# Patient Record
Sex: FEMALE | Race: OTHER | HISPANIC OR LATINO | Employment: FULL TIME | URBAN - METROPOLITAN AREA
[De-identification: names, ages, dates, MRNs, and addresses within clinical notes are randomized per-mention and may not be internally consistent; named-entity substitution may affect disease eponyms.]

---

## 2020-03-17 ENCOUNTER — OFFICE VISIT (OUTPATIENT)
Dept: OBGYN CLINIC | Facility: MEDICAL CENTER | Age: 35
End: 2020-03-17
Payer: OTHER MISCELLANEOUS

## 2020-03-17 VITALS
DIASTOLIC BLOOD PRESSURE: 80 MMHG | BODY MASS INDEX: 27.31 KG/M2 | SYSTOLIC BLOOD PRESSURE: 125 MMHG | WEIGHT: 160 LBS | HEIGHT: 64 IN

## 2020-03-17 DIAGNOSIS — M25.562 ACUTE PAIN OF LEFT KNEE: Primary | ICD-10-CM

## 2020-03-17 PROCEDURE — 99203 OFFICE O/P NEW LOW 30 MIN: CPT | Performed by: ORTHOPAEDIC SURGERY

## 2020-03-17 RX ORDER — LISINOPRIL 20 MG/1
20 TABLET ORAL DAILY
COMMUNITY

## 2020-03-17 NOTE — LETTER
March 17, 2020     Patient: Kristie Lewis   YOB: 1985   Date of Visit: 3/17/2020       To Whom it May Concern:    Kristie Lewis is under my professional care  She was seen in my office on 3/17/2020  She may return to work with limitations no running, no walking greater than 10 minutes without rest   These restriction will be in place through 4/1/2020  If you have any questions or concerns, please don't hesitate to call           Sincerely,          Terell Dick MD        CC: No Recipients

## 2020-03-17 NOTE — PROGRESS NOTES
Orthopaedic Surgery Note    CC: Left Knee Pain      HPI:  Ms Bianca Ray is a 30 y  o female with a history of left knee pain following an incident at work  Patient reports she works as a  and was attempting to arrest someone on 3/12/2020 and during the arrest she fell onto a concrete surface and impacted the anterior aspect of the left knee  She did have immediate pain and developed swelling  [facility] obtained, which she has brought on a disk today  She did not have a radiology report  She was also provided a knee brace  ALLERGIES:  No Known Allergies    CURRENT MEDICATIONS:  Current Outpatient Medications   Medication Sig Dispense Refill    lisinopril (ZESTRIL) 20 mg tablet Take 20 mg by mouth daily       No current facility-administered medications for this visit  PAST MEDICAL HISTORY  Past Medical History:   Diagnosis Date    Hypertension        SURGICAL HISTORY  History reviewed  No pertinent surgical history  FAMILY HISTORY  History reviewed  No pertinent family history      SOCIAL HISTORY  Social History     Socioeconomic History    Marital status: /Civil Union     Spouse name: Not on file    Number of children: Not on file    Years of education: Not on file    Highest education level: Not on file   Occupational History    Not on file   Social Needs    Financial resource strain: Not on file    Food insecurity:     Worry: Not on file     Inability: Not on file    Transportation needs:     Medical: Not on file     Non-medical: Not on file   Tobacco Use    Smoking status: Never Smoker    Smokeless tobacco: Never Used   Substance and Sexual Activity    Alcohol use: Not on file    Drug use: Not on file    Sexual activity: Not on file   Lifestyle    Physical activity:     Days per week: Not on file     Minutes per session: Not on file    Stress: Not on file   Relationships    Social connections:     Talks on phone: Not on file     Gets together: Not on file     Attends Sabianism service: Not on file     Active member of club or organization: Not on file     Attends meetings of clubs or organizations: Not on file     Relationship status: Not on file    Intimate partner violence:     Fear of current or ex partner: Not on file     Emotionally abused: Not on file     Physically abused: Not on file     Forced sexual activity: Not on file   Other Topics Concern    Not on file   Social History Narrative    Not on file         Review of Systems   negative except per above and HPI  Physical Exam    Vitals  Vitals:    03/17/20 0816   BP: 125/80       BMI  Body mass index is 27 46 kg/m²  GENERAL: No acute distress  Alert and oriented  Well nourished and well hydrated  Appears stated age  HEENT : Normocephalic, atraumatic  Extraocular movements intact  Mucous membranes moist  NECK: Supple, trachea midline  LUNGS: Adequate and symmetric respiratory effort  No intercostal retractions or accessory muscle use  HEART: Extremities warm and perfused  ABDOMEN: Nondistended  SKIN: Warm and dry, no rash  Left  Knee   Inspection/Appearance:       Swelling: Yes      Patella is midline  Superficial abrasion overlying the anterior aspect of the knee  Alignment:  Knee is in neutral  Palpation - Soft Tissue: normal without effusion  ROM:       Extension - 0          Flexion - 100 (limited by pain)      extensor lag: no  Stability:  demonstrates no varus, valgus, anterior drawer or posterior drawer  Patella: stable, tracks normally  Motor:        5/5 quadriceps       5/5 hamstrings  Vascular: Knee is warm and sensate with normal refill  Negative Faheem        Imaging  A) Imaging modality available  Radiographs: yes  MRI scan: no  CT scan: no    B) Imaging findings  Subchondral cysts: no  Subchondral sclerosis: no  Periarticular osteophytes: no  Joint subluxation: no  Joint space narrowing: no  Bone-on-bone articulations: no  Avascular necrosis: no    No fracture or dislocation on my review  Assessment and Plan  Left Knee Contusion    - discussed with patient that radiographs do not reveal any fracture or dislocation on my review  At this point in time her exam does not demonstrate any ligamentous instability  - recommend PT, WBAT, ROM as tolerated  - NSAIDs, tylenol, icing, elevation  - I did  her that I think this will likely improve over the next few weeks with above and PT  - PT order placed  - wean out of brace  - work noted provided  - followup 2 weeks for repeat exam, if not significant improvement would consider MRI              Alexa Bingham MD  Adult Reconstruction Surgery  Department of [place] [id number]  8:44 AM

## 2020-03-19 ENCOUNTER — EVALUATION (OUTPATIENT)
Dept: PHYSICAL THERAPY | Facility: MEDICAL CENTER | Age: 35
End: 2020-03-19
Payer: OTHER MISCELLANEOUS

## 2020-03-19 DIAGNOSIS — M25.562 ACUTE PAIN OF LEFT KNEE: Primary | ICD-10-CM

## 2020-03-19 PROCEDURE — 97161 PT EVAL LOW COMPLEX 20 MIN: CPT | Performed by: PHYSICAL THERAPIST

## 2020-03-19 PROCEDURE — 97112 NEUROMUSCULAR REEDUCATION: CPT | Performed by: PHYSICAL THERAPIST

## 2020-03-19 NOTE — PROGRESS NOTES
PT Evaluation     Today's date: 3/19/2020  Patient name: Tanisha Johnson  : 1985  MRN: [id number]  Referring provider: Brent Parker MD  Dx:   Encounter Diagnosis   Name Primary?  Acute pain of left knee                   Assessment  Assessment details: Tanisha Johnson is a 31 y/o female who presents with complaints of left knee pain  The patients greatest concerns are not being able to walk, stand, or perform functional activities without pain  Patient presents c/ bruising over the left patella and inferior fat pad inflammation  No ligamentous laxity present  She demonstrates restricted ROM and painful ambulation  Primary movement impairment diagnosis of left patellofemoral hypomobility, resulting in pathoanatomical symptoms of acute pain of left knee, which limits her ability to perform functional activities without pain  Pt  will benefit from skilled PT services that includes manual therapy techniques to enhance tissue extensibility, neuromuscular re-education to facilitate motor control, therapeutic exercise to increase functional mobility, and modalities prn to reduce pain and inflammation    Impairments: abnormal coordination, abnormal gait, abnormal muscle firing, abnormal or restricted ROM, abnormal movement, activity intolerance, impaired balance, impaired physical strength, lacks appropriate home exercise program, pain with function and weight-bearing intolerance  Understanding of Dx/Px/POC: good   Prognosis: good    Goals  Impairment Goals  - Pt I with initial HEP in 1-2 visits  - Improve ROM equal to contralateral side in 4-6 weeks  - Increase strength to 5/5 in all affected areas in 4-6 weeks    Functional Goals  - Increase Functional Status Measure to: 62 in 6-8 weeks  - Patient will be independent with comprehensive HEP in 6-8 weeks  - Ambulation is improved to prior level of function in 6-8 weeks  - Stair climbing is improved to prior level of function in 6-8 weeks  - Squatting is improved to prior level of function in 6-8 weeks    Plan  Patient would benefit from: PT eval  Planned modality interventions: thermotherapy: hydrocollator packs and cryotherapy  Planned therapy interventions: manual therapy, joint mobilization, neuromuscular re-education, patient education, strengthening, stretching, therapeutic exercise, home exercise program, graded exercise, gait training, functional ROM exercises, flexibility, activity modification, balance, balance/weight bearing training and body mechanics training  Frequency: 2x week  Duration in weeks: 8  Treatment plan discussed with: patient      Subjective Evaluation    History of Present Illness  Mechanism of injury: Patient works as a  and on [employer], she was making an arrest when she fell on concrete and landed on her knee  She does not recall a twisting mechanism, or hearing a pop  She states that is extremely painful to flex and extend her knee  She c/o lateral and inferior knee pain  Patient would like to return to performing all functional activities without pain  Pain  Current pain ratin  At best pain ratin  At worst pain ratin  Quality: tight and sharp  Relieving factors: ice  Aggravating factors: standing and walking      Diagnostic Tests  X-ray: normal  Treatments  Current treatment: physical therapy  Patient Goals  Patient goals for therapy: increased strength, independence with ADLs/IADLs, return to sport/leisure activities, increased motion, improved balance, decreased pain and decreased edema        Objective     Observations   Left Knee   Positive for effusion and trophic changes  Negative for deformity  Palpation   Left   Tenderness of the lateral gastrocnemius and vastus lateralis  Tenderness   Left Knee   Tenderness in the inferior fat pad  No tenderness in the fibular head, inferior patella, lateral joint line, LCL (distal), LCL (proximal) and medial joint line       Neurological Testing     Sensation     Knee   Left Knee   Intact: light touch    Right Knee   Intact: light touch     Active Range of Motion   Left Knee   Flexion: 100 degrees   Extension: 5 degrees     Right Knee   Flexion: 140 degrees   Extension: -5 degrees   Extensor lag: 10 degrees     Passive Range of Motion   Left Knee   Flexion: 110 degrees   Extension: 0 degrees     Mobility   Patellar Mobility:   Left Knee   Hypomobile: left superior and left inferior  Tibiofemoral Mobility:   Left knee   Tibiofemoral tendons within functional limits include the anterior and posterior  Right knee Tibiofemoral tendons within functional limits include the anterior and posterior  Patellar Static Positioning   Left Knee: WFL  Right Knee: [facility]    Strength/Myotome Testing     Left Knee   Flexion: 3-  Extension: 3-  Quadriceps contraction: poor    Right Knee   Flexion: 5  Extension: 5  Quadriceps contraction: good    Additional Strength Details  Hip strength testing deferred d/t pain  Tests     Left Knee   Negative anterior drawer, anterior Lachman, lateral Faheem, medial Faheem, posterior drawer, posterior Lachman, valgus stress test at 0 degrees, valgus stress test at 30 degrees, varus stress test at 0 degrees and varus stress test at 30 degrees          Precautions:  None    Manual  3/19            PROM             Patellar mobs                                                        Exercise Diary              Heel slides 30x5s            Quad sets 30x5s            Gastroc str 3x30s            Hamstring str 3x30s            Quad str 3x30s                                                                                                                                                                                                                   Modalities              ICE             Compression X                                Marion Miller, PT  3/19/2020,9:02 AM

## 2020-03-25 ENCOUNTER — OFFICE VISIT (OUTPATIENT)
Dept: PHYSICAL THERAPY | Facility: MEDICAL CENTER | Age: 35
End: 2020-03-25
Payer: OTHER MISCELLANEOUS

## 2020-03-25 DIAGNOSIS — M25.562 ACUTE PAIN OF LEFT KNEE: Primary | ICD-10-CM

## 2020-03-25 PROCEDURE — 97110 THERAPEUTIC EXERCISES: CPT | Performed by: PHYSICAL THERAPIST

## 2020-03-25 PROCEDURE — 97112 NEUROMUSCULAR REEDUCATION: CPT | Performed by: PHYSICAL THERAPIST

## 2020-03-25 NOTE — PROGRESS NOTES
Daily Note     Today's date: 3/25/2020  Patient name: Alonzo Salcido  : 1985  MRN: [id number]  Referring provider: Elenita Mac MD  Dx:   Encounter Diagnosis     ICD-10-CM    1  Acute pain of left knee M25 562                   Subjective:  Patient states that she is feeling better overall  Objective: See treatment diary below    Assessment:  Patient presents c/ antalgic gait pattern  PROM 0-120  Patient education on HEP and proper gait mechanics  Tolerated treatment well  Patient would benefit from continued PT  Plan: Continue per plan of care        Precautions:  None    Manual  3/19 3/25           PROM  AZ           Patellar mobs  AZ                                                      Exercise Diary              Bike  5'           Heel slides 30x5s 30x5s           Quad sets 30x5s 30x5s           Gastroc str 3x30s 3x30s           Hamstring str 3x30s 3x30s           Quad str 3x30s            Knee flex AAROM  20x           SLR  3x10                                                                                                                                                                                        Modalities              ICE  10'           Compression X X

## 2020-03-27 ENCOUNTER — OFFICE VISIT (OUTPATIENT)
Dept: PHYSICAL THERAPY | Facility: MEDICAL CENTER | Age: 35
End: 2020-03-27
Payer: OTHER MISCELLANEOUS

## 2020-03-27 DIAGNOSIS — M25.562 ACUTE PAIN OF LEFT KNEE: Primary | ICD-10-CM

## 2020-03-27 PROCEDURE — 97110 THERAPEUTIC EXERCISES: CPT | Performed by: PHYSICAL THERAPIST

## 2020-03-27 PROCEDURE — 97112 NEUROMUSCULAR REEDUCATION: CPT | Performed by: PHYSICAL THERAPIST

## 2020-03-27 NOTE — PROGRESS NOTES
Daily Note     Today's date: 3/27/2020  Patient name: Mya Prado  : 1985  MRN: [id number]  Referring provider: David Ron MD  Dx:   Encounter Diagnosis     ICD-10-CM    1  Acute pain of left knee M25 562                   Subjective:  Patient reports that she feels good  Objective: See treatment diary below    Assessment:  Patient presents c/ positive gains in strength and ROM  Patient education on HEP and proper gait mechanics  Tolerated treatment well  Patient would benefit from continued PT  Plan: Continue per plan of care        Precautions:  None    Manual  3/19 3/25 3/27          PROM  AZ AZ          Patellar mobs  AZ AZ                                                     Exercise Diary              Bike  5' 10'          Heel slides 30x5s 30x5s 30x5s          Quad sets 30x5s 30x5s 30x5s          Gastroc str 3x30s 3x30s 3x30s          Hamstring str 3x30s 3x30s 3x30s          Quad str 3x30s  3x30s          Knee flex AAROM  20x 20x          SLR  3x10 3x10 1#                       Prone hamstring curls   2x10          Heel raises   3x10                       Step ups   10x L up          Step downs   10x L up R down                       SL balance   alph 1x                                                                                            Modalities              ICE  10' home          Compression X X           MH

## 2020-04-01 ENCOUNTER — OFFICE VISIT (OUTPATIENT)
Dept: PHYSICAL THERAPY | Facility: MEDICAL CENTER | Age: 35
End: 2020-04-01
Payer: OTHER MISCELLANEOUS

## 2020-04-01 DIAGNOSIS — M25.562 ACUTE PAIN OF LEFT KNEE: Primary | ICD-10-CM

## 2020-04-01 PROCEDURE — 97140 MANUAL THERAPY 1/> REGIONS: CPT | Performed by: PHYSICAL THERAPIST

## 2020-04-01 PROCEDURE — 97112 NEUROMUSCULAR REEDUCATION: CPT | Performed by: PHYSICAL THERAPIST

## 2020-04-01 PROCEDURE — 97110 THERAPEUTIC EXERCISES: CPT | Performed by: PHYSICAL THERAPIST

## 2020-04-03 ENCOUNTER — OFFICE VISIT (OUTPATIENT)
Dept: PHYSICAL THERAPY | Facility: MEDICAL CENTER | Age: 35
End: 2020-04-03
Payer: OTHER MISCELLANEOUS

## 2020-04-03 DIAGNOSIS — M25.562 ACUTE PAIN OF LEFT KNEE: Primary | ICD-10-CM

## 2020-04-03 PROCEDURE — 97110 THERAPEUTIC EXERCISES: CPT | Performed by: PHYSICAL THERAPIST

## 2020-04-03 PROCEDURE — 97140 MANUAL THERAPY 1/> REGIONS: CPT | Performed by: PHYSICAL THERAPIST

## 2020-04-03 PROCEDURE — 97112 NEUROMUSCULAR REEDUCATION: CPT | Performed by: PHYSICAL THERAPIST

## 2020-04-08 ENCOUNTER — OFFICE VISIT (OUTPATIENT)
Dept: PHYSICAL THERAPY | Facility: MEDICAL CENTER | Age: 35
End: 2020-04-08
Payer: OTHER MISCELLANEOUS

## 2020-04-08 DIAGNOSIS — M25.562 ACUTE PAIN OF LEFT KNEE: Primary | ICD-10-CM

## 2020-04-08 PROCEDURE — 97110 THERAPEUTIC EXERCISES: CPT | Performed by: PHYSICAL THERAPIST

## 2020-04-08 PROCEDURE — 97112 NEUROMUSCULAR REEDUCATION: CPT | Performed by: PHYSICAL THERAPIST

## 2020-04-10 ENCOUNTER — OFFICE VISIT (OUTPATIENT)
Dept: PHYSICAL THERAPY | Facility: MEDICAL CENTER | Age: 35
End: 2020-04-10
Payer: OTHER MISCELLANEOUS

## 2020-04-10 DIAGNOSIS — M25.562 ACUTE PAIN OF LEFT KNEE: Primary | ICD-10-CM

## 2020-04-10 PROCEDURE — 97112 NEUROMUSCULAR REEDUCATION: CPT | Performed by: PHYSICAL THERAPIST

## 2020-04-10 PROCEDURE — 97110 THERAPEUTIC EXERCISES: CPT | Performed by: PHYSICAL THERAPIST

## 2020-04-13 ENCOUNTER — OFFICE VISIT (OUTPATIENT)
Dept: PHYSICAL THERAPY | Facility: MEDICAL CENTER | Age: 35
End: 2020-04-13
Payer: OTHER MISCELLANEOUS

## 2020-04-13 DIAGNOSIS — M25.562 ACUTE PAIN OF LEFT KNEE: Primary | ICD-10-CM

## 2020-04-13 PROCEDURE — 97110 THERAPEUTIC EXERCISES: CPT | Performed by: PHYSICAL THERAPIST

## 2020-04-13 PROCEDURE — 97112 NEUROMUSCULAR REEDUCATION: CPT | Performed by: PHYSICAL THERAPIST

## 2020-04-13 PROCEDURE — 97530 THERAPEUTIC ACTIVITIES: CPT | Performed by: PHYSICAL THERAPIST

## 2020-04-15 ENCOUNTER — APPOINTMENT (OUTPATIENT)
Dept: PHYSICAL THERAPY | Facility: MEDICAL CENTER | Age: 35
End: 2020-04-15
Payer: OTHER MISCELLANEOUS

## 2020-04-17 ENCOUNTER — APPOINTMENT (OUTPATIENT)
Dept: PHYSICAL THERAPY | Facility: MEDICAL CENTER | Age: 35
End: 2020-04-17
Payer: OTHER MISCELLANEOUS

## 2020-04-17 DIAGNOSIS — M25.562 ACUTE PAIN OF LEFT KNEE: Primary | ICD-10-CM

## 2020-04-17 PROCEDURE — 97110 THERAPEUTIC EXERCISES: CPT | Performed by: PHYSICAL THERAPIST

## 2020-04-17 PROCEDURE — 97530 THERAPEUTIC ACTIVITIES: CPT | Performed by: PHYSICAL THERAPIST

## 2020-04-17 PROCEDURE — 97112 NEUROMUSCULAR REEDUCATION: CPT | Performed by: PHYSICAL THERAPIST

## 2020-04-20 ENCOUNTER — OFFICE VISIT (OUTPATIENT)
Dept: PHYSICAL THERAPY | Facility: MEDICAL CENTER | Age: 35
End: 2020-04-20
Payer: OTHER MISCELLANEOUS

## 2020-04-20 DIAGNOSIS — M25.562 ACUTE PAIN OF LEFT KNEE: Primary | ICD-10-CM

## 2020-04-20 PROCEDURE — 97530 THERAPEUTIC ACTIVITIES: CPT | Performed by: PHYSICAL THERAPIST

## 2020-04-20 PROCEDURE — 97110 THERAPEUTIC EXERCISES: CPT | Performed by: PHYSICAL THERAPIST

## 2020-04-20 PROCEDURE — 97112 NEUROMUSCULAR REEDUCATION: CPT | Performed by: PHYSICAL THERAPIST

## 2020-04-24 ENCOUNTER — OFFICE VISIT (OUTPATIENT)
Dept: PHYSICAL THERAPY | Facility: MEDICAL CENTER | Age: 35
End: 2020-04-24
Payer: OTHER MISCELLANEOUS

## 2020-04-24 DIAGNOSIS — M25.562 ACUTE PAIN OF LEFT KNEE: Primary | ICD-10-CM

## 2020-04-24 PROCEDURE — 97140 MANUAL THERAPY 1/> REGIONS: CPT | Performed by: PHYSICAL THERAPIST

## 2020-04-24 PROCEDURE — 97110 THERAPEUTIC EXERCISES: CPT | Performed by: PHYSICAL THERAPIST

## 2020-04-24 PROCEDURE — 97530 THERAPEUTIC ACTIVITIES: CPT | Performed by: PHYSICAL THERAPIST

## 2020-04-28 ENCOUNTER — OFFICE VISIT (OUTPATIENT)
Dept: PHYSICAL THERAPY | Facility: MEDICAL CENTER | Age: 35
End: 2020-04-28
Payer: OTHER MISCELLANEOUS

## 2020-04-28 DIAGNOSIS — M25.562 ACUTE PAIN OF LEFT KNEE: Primary | ICD-10-CM

## 2020-04-28 PROCEDURE — 97110 THERAPEUTIC EXERCISES: CPT | Performed by: PHYSICAL THERAPIST

## 2020-04-28 PROCEDURE — 97140 MANUAL THERAPY 1/> REGIONS: CPT | Performed by: PHYSICAL THERAPIST

## 2020-04-28 PROCEDURE — 97530 THERAPEUTIC ACTIVITIES: CPT | Performed by: PHYSICAL THERAPIST

## 2020-05-01 ENCOUNTER — OFFICE VISIT (OUTPATIENT)
Dept: PHYSICAL THERAPY | Facility: MEDICAL CENTER | Age: 35
End: 2020-05-01
Payer: OTHER MISCELLANEOUS

## 2020-05-01 DIAGNOSIS — M25.562 ACUTE PAIN OF LEFT KNEE: Primary | ICD-10-CM

## 2020-05-01 PROCEDURE — 97530 THERAPEUTIC ACTIVITIES: CPT | Performed by: PHYSICAL THERAPIST

## 2020-05-01 PROCEDURE — 97140 MANUAL THERAPY 1/> REGIONS: CPT | Performed by: PHYSICAL THERAPIST

## 2020-05-01 PROCEDURE — 97110 THERAPEUTIC EXERCISES: CPT | Performed by: PHYSICAL THERAPIST

## 2020-05-08 ENCOUNTER — OFFICE VISIT (OUTPATIENT)
Dept: PHYSICAL THERAPY | Facility: MEDICAL CENTER | Age: 35
End: 2020-05-08
Payer: OTHER MISCELLANEOUS

## 2020-05-08 DIAGNOSIS — M25.562 ACUTE PAIN OF LEFT KNEE: Primary | ICD-10-CM

## 2020-05-08 PROCEDURE — 97530 THERAPEUTIC ACTIVITIES: CPT | Performed by: PHYSICAL THERAPIST

## 2020-05-08 PROCEDURE — 97140 MANUAL THERAPY 1/> REGIONS: CPT | Performed by: PHYSICAL THERAPIST

## 2020-05-08 PROCEDURE — 97110 THERAPEUTIC EXERCISES: CPT | Performed by: PHYSICAL THERAPIST

## 2020-06-10 ENCOUNTER — OFFICE VISIT (OUTPATIENT)
Dept: OBGYN CLINIC | Facility: CLINIC | Age: 35
End: 2020-06-10
Payer: OTHER MISCELLANEOUS

## 2020-06-10 VITALS
SYSTOLIC BLOOD PRESSURE: 142 MMHG | WEIGHT: 120 LBS | BODY MASS INDEX: 22.66 KG/M2 | HEIGHT: 61 IN | DIASTOLIC BLOOD PRESSURE: 97 MMHG

## 2020-06-10 DIAGNOSIS — M25.562 CHRONIC PAIN OF LEFT KNEE: ICD-10-CM

## 2020-06-10 DIAGNOSIS — G89.29 CHRONIC PAIN OF LEFT KNEE: ICD-10-CM

## 2020-06-10 DIAGNOSIS — M25.362 KNEE INSTABILITY, LEFT: Primary | ICD-10-CM

## 2020-06-10 PROCEDURE — 99214 OFFICE O/P EST MOD 30 MIN: CPT | Performed by: ORTHOPAEDIC SURGERY

## 2020-06-11 ENCOUNTER — TELEPHONE (OUTPATIENT)
Dept: OBGYN CLINIC | Facility: MEDICAL CENTER | Age: 35
End: 2020-06-11

## 2020-06-18 ENCOUNTER — TELEPHONE (OUTPATIENT)
Dept: OBGYN CLINIC | Facility: MEDICAL CENTER | Age: 35
End: 2020-06-18

## 2020-06-18 DIAGNOSIS — G89.29 CHRONIC PAIN OF LEFT KNEE: Primary | ICD-10-CM

## 2020-06-18 DIAGNOSIS — M25.562 CHRONIC PAIN OF LEFT KNEE: Primary | ICD-10-CM

## 2020-06-22 ENCOUNTER — APPOINTMENT (OUTPATIENT)
Dept: LAB | Facility: MEDICAL CENTER | Age: 35
End: 2020-06-22
Payer: COMMERCIAL

## 2020-06-22 ENCOUNTER — OFFICE VISIT (OUTPATIENT)
Dept: OBGYN CLINIC | Facility: MEDICAL CENTER | Age: 35
End: 2020-06-22
Payer: OTHER MISCELLANEOUS

## 2020-06-22 VITALS
DIASTOLIC BLOOD PRESSURE: 90 MMHG | SYSTOLIC BLOOD PRESSURE: 144 MMHG | BODY MASS INDEX: 20.49 KG/M2 | WEIGHT: 120 LBS | HEART RATE: 81 BPM | TEMPERATURE: 98.7 F | HEIGHT: 64 IN

## 2020-06-22 DIAGNOSIS — S83.242A OTHER TEAR OF MEDIAL MENISCUS, CURRENT INJURY, LEFT KNEE, INITIAL ENCOUNTER: Primary | ICD-10-CM

## 2020-06-22 DIAGNOSIS — G89.29 CHRONIC PAIN OF LEFT KNEE: ICD-10-CM

## 2020-06-22 DIAGNOSIS — Z01.818 ENCOUNTER FOR PREADMISSION TESTING: ICD-10-CM

## 2020-06-22 DIAGNOSIS — M25.562 CHRONIC PAIN OF LEFT KNEE: ICD-10-CM

## 2020-06-22 LAB
ANION GAP SERPL CALCULATED.3IONS-SCNC: 5 MMOL/L (ref 4–13)
BASOPHILS # BLD AUTO: 0.06 THOUSANDS/ΜL (ref 0–0.1)
BASOPHILS NFR BLD AUTO: 1 % (ref 0–1)
BUN SERPL-MCNC: 14 MG/DL (ref 5–25)
CALCIUM SERPL-MCNC: 9 MG/DL (ref 8.3–10.1)
CHLORIDE SERPL-SCNC: 110 MMOL/L (ref 100–108)
CO2 SERPL-SCNC: 25 MMOL/L (ref 21–32)
CREAT SERPL-MCNC: 0.95 MG/DL (ref 0.6–1.3)
EOSINOPHIL # BLD AUTO: 0.16 THOUSAND/ΜL (ref 0–0.61)
EOSINOPHIL NFR BLD AUTO: 2 % (ref 0–6)
ERYTHROCYTE [DISTWIDTH] IN BLOOD BY AUTOMATED COUNT: 12.4 % (ref 11.6–15.1)
GFR SERPL CREATININE-BSD FRML MDRD: 79 ML/MIN/1.73SQ M
GLUCOSE P FAST SERPL-MCNC: 91 MG/DL (ref 65–99)
HCT VFR BLD AUTO: 41.3 % (ref 34.8–46.1)
HGB BLD-MCNC: 13.1 G/DL (ref 11.5–15.4)
IMM GRANULOCYTES # BLD AUTO: 0.01 THOUSAND/UL (ref 0–0.2)
IMM GRANULOCYTES NFR BLD AUTO: 0 % (ref 0–2)
LYMPHOCYTES # BLD AUTO: 1.45 THOUSANDS/ΜL (ref 0.6–4.47)
LYMPHOCYTES NFR BLD AUTO: 21 % (ref 14–44)
MCH RBC QN AUTO: 31.3 PG (ref 26.8–34.3)
MCHC RBC AUTO-ENTMCNC: 31.7 G/DL (ref 31.4–37.4)
MCV RBC AUTO: 99 FL (ref 82–98)
MONOCYTES # BLD AUTO: 0.72 THOUSAND/ΜL (ref 0.17–1.22)
MONOCYTES NFR BLD AUTO: 11 % (ref 4–12)
NEUTROPHILS # BLD AUTO: 4.36 THOUSANDS/ΜL (ref 1.85–7.62)
NEUTS SEG NFR BLD AUTO: 65 % (ref 43–75)
NRBC BLD AUTO-RTO: 0 /100 WBCS
PLATELET # BLD AUTO: 349 THOUSANDS/UL (ref 149–390)
PMV BLD AUTO: 9.8 FL (ref 8.9–12.7)
POTASSIUM SERPL-SCNC: 4.6 MMOL/L (ref 3.5–5.3)
RBC # BLD AUTO: 4.19 MILLION/UL (ref 3.81–5.12)
SODIUM SERPL-SCNC: 140 MMOL/L (ref 136–145)
WBC # BLD AUTO: 6.76 THOUSAND/UL (ref 4.31–10.16)

## 2020-06-22 PROCEDURE — 85025 COMPLETE CBC W/AUTO DIFF WBC: CPT

## 2020-06-22 PROCEDURE — 80048 BASIC METABOLIC PNL TOTAL CA: CPT

## 2020-06-22 PROCEDURE — 36415 COLL VENOUS BLD VENIPUNCTURE: CPT

## 2020-06-22 PROCEDURE — 99214 OFFICE O/P EST MOD 30 MIN: CPT | Performed by: ORTHOPAEDIC SURGERY

## 2020-06-22 RX ORDER — CEFAZOLIN SODIUM 2 G/50ML
2000 SOLUTION INTRAVENOUS ONCE
Status: CANCELLED | OUTPATIENT
Start: 2020-07-21 | End: 2020-06-22

## 2020-06-22 RX ORDER — ACETAMINOPHEN 325 MG/1
650 TABLET ORAL EVERY 6 HOURS PRN
Status: CANCELLED | OUTPATIENT
Start: 2020-06-22

## 2020-06-22 RX ORDER — TRAMADOL HYDROCHLORIDE 50 MG/1
50 TABLET ORAL EVERY 6 HOURS SCHEDULED
Status: CANCELLED | OUTPATIENT
Start: 2020-06-22

## 2020-06-22 NOTE — H&P (VIEW-ONLY)
Ortho Sports Medicine Knee New Patient Visit     Assesment:   37 y o  female left knee lateral meniscus tear    Plan:    Conservative treatment:    Ice to knee for 20 minutes at least 1-2 times daily  Crutches written  Post-op written     Imaging: All imaging from today was reviewed by myself and explained to the patient  Injection:    No Injection planned at this time  Surgery: All of the risks and benefits of operative treatment were explained to the patient, as well as the risks and benefits of any alternative treatment options, including nonoperative care  The risks of surgical treatement include, but are not limited to, infection, bleeding, blood clot, neurovascular damage, need for further surgery, continued pain, cardiovascular risk, and anesthesia risk  The patient understood this and elects to proceed forward with surgical intervention  We will proceed forward with surgical arthroscopy of the knee with lateral meniscectomy  Patient has tried and failed conservative treatment of greater than 5 weeks of physical therapy without relief and addition of her age I recommend that we proceed forward with Left knee lateral menisectomy  Follow up:    Return for 1 week post-op  Chief Complaint   Patient presents with    Left Knee - Pain       History of Present Illness: The patient is a 37 y o  female whose occupation is , referred to me by themself, seen in clinic for consultation of left knee pain  Pain is located lateral   The patient rates the pain as a 5/10  The pain has been present for 3 months  The patient sustained an injury in March 2020  Patient states that she has a  in 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 knee struck concrete the anterior aspect of her knee  She stated that she initially had a sharp stabbing pain in the lateral aspect of the knee  She had difficulty bearing weight immediately    Her knee did become very swollen and it took many days to reduce  She states that she had difficulty with range of motion  She was initially treated by [facility] in a prescribed her physical therapy that had improved her range of motion but she still has pain and lateral aspect of the knee with certain activities  She states the pain is present and described as sharp and stabbing with steep stairs, squatting, hyperflexion and any pivoting type motion  The pain is characterized as sharp, stabbing  The pain is present daily  Pain is improved by rest and ice  Pain is aggravated by stairs, squatting and pivoting on a planted foot  Symptoms include clicking, swelling and locking  The patient has tried rest, ice, NSAIDS and physical therapy  Knee Surgical History:  None    Past Medical, Social and Family History:  Past Medical History:   Diagnosis Date    Hypertension      History reviewed  No pertinent surgical history  No Known Allergies  Current Outpatient Medications on File Prior to Visit   Medication Sig Dispense Refill    lisinopril (ZESTRIL) 20 mg tablet Take 20 mg by mouth daily       No current facility-administered medications on file prior to visit        Social History     Socioeconomic History    Marital status: /Civil Union     Spouse name: Not on file    Number of children: Not on file    Years of education: Not on file    Highest education level: Not on file   Occupational History    Not on file   Social Needs    Financial resource strain: Not on file    Food insecurity:     Worry: Not on file     Inability: Not on file    Transportation needs:     Medical: Not on file     Non-medical: Not on file   Tobacco Use    Smoking status: Never Smoker    Smokeless tobacco: Never Used   Substance and Sexual Activity    Alcohol use: Not on file    Drug use: Not on file    Sexual activity: Not on file   Lifestyle    Physical activity:     Days per week: Not on file     Minutes per session: Not on file    Stress: Not on file   Relationships    Social connections:     Talks on phone: Not on file     Gets together: Not on file     Attends Jewish service: Not on file     Active member of club or organization: Not on file     Attends meetings of clubs or organizations: Not on file     Relationship status: Not on file    Intimate partner violence:     Fear of current or ex partner: Not on file     Emotionally abused: Not on file     Physically abused: Not on file     Forced sexual activity: Not on file   Other Topics Concern    Not on file   Social History Narrative    Not on file         I have reviewed the past medical, surgical, social and family history, medications and allergies as documented in the EMR  Review of systems: ROS is negative other than that noted in the HPI  Constitutional: Negative for fatigue and fever  HENT: Negative for sore throat  Respiratory: Negative for shortness of breath  Cardiovascular: Negative for chest pain  Gastrointestinal: Negative for abdominal pain  Endocrine: Negative for cold intolerance and heat intolerance  Genitourinary: Negative for flank pain  Musculoskeletal: Negative for back pain  Skin: Negative for rash  Allergic/Immunologic: Negative for immunocompromised state  Neurological: Negative for dizziness  Psychiatric/Behavioral: Negative for agitation  Physical Exam:    Blood pressure 144/90, pulse 81, temperature 98 7 °F (37 1 °C), height 5' 4" (1 626 m), weight 54 4 kg (120 lb)  General/Constitutional: NAD, well developed, well nourished  HENT: Normocephalic, atraumatic  CV: Intact distal pulses, regular rate  Resp: No respiratory distress or labored breathing  Lymphatic: No lymphadenopathy palpated  Neuro: Alert and Oriented x 3, no focal deficits  Psych: Normal mood, normal affect, normal judgement, normal behavior  Skin: Warm, dry, no rashes, no erythema      Knee Exam (focused):                 RIGHT LEFT   ROM:   0-130 0-130 pain laterally with hyperflexion    Palpation: Effusion negative small     MJL tenderness Negative Negative     LJL tenderness Negative Positive   Meniscus: Faheem Negative Positive    Apley's Compression Negative Positive   Instability: Varus stable stable     Valgus stable stable   Special Tests: Lachman Negative Negative     Posterior drawer Negative Negative     Anterior drawer Negative Negative     Pivot shift not tested not tested     Dial not tested not tested   Patella: Palpation no tenderness ttp inferior pole     Mobility 1/4 1/4     Apprehension Negative Negative   Other: Single leg 1/4 squat not tested not tested      LE NV Exam: +2 DP/PT pulses bilaterally  Sensation intact to light touch L2-S1 bilaterally     Bilateral hip ROM demonstrates no pain actively or passively    No calf tenderness to palpation bilaterally    Knee Imaging    X-rays of the left knee were reviewed, which demonstrate no acute pain or osseous abnormality  I have reviewed the radiology report and do not currently have a radiology reading from the outside facility, but have called and asked the facility to fax the official report to our office  MRI of the left knee were reviewed, which demonstrate lateral meniscus tear  I have reviewed the radiology report and do not currently have a radiology reading from the outside facility, but have called and asked the facility to fax the official report to our office

## 2020-07-15 ENCOUNTER — TELEPHONE (OUTPATIENT)
Dept: OBGYN CLINIC | Facility: HOSPITAL | Age: 35
End: 2020-07-15

## 2020-07-15 DIAGNOSIS — S83.242A OTHER TEAR OF MEDIAL MENISCUS, CURRENT INJURY, LEFT KNEE, INITIAL ENCOUNTER: ICD-10-CM

## 2020-07-15 PROCEDURE — U0003 INFECTIOUS AGENT DETECTION BY NUCLEIC ACID (DNA OR RNA); SEVERE ACUTE RESPIRATORY SYNDROME CORONAVIRUS 2 (SARS-COV-2) (CORONAVIRUS DISEASE [COVID-19]), AMPLIFIED PROBE TECHNIQUE, MAKING USE OF HIGH THROUGHPUT TECHNOLOGIES AS DESCRIBED BY CMS-2020-01-R: HCPCS

## 2020-07-15 NOTE — TELEPHONE ENCOUNTER
Patient sees Dr Jorge Mcintosh  She is calling to find out locations for COVID testing for her upcoming surgery  Advised of locations and hours

## 2020-07-16 LAB
INPATIENT: NORMAL
SARS-COV-2 RNA SPEC QL NAA+PROBE: NOT DETECTED

## 2020-07-16 NOTE — PRE-PROCEDURE INSTRUCTIONS
Pre-Surgery Instructions:   Medication Instructions    lisinopril (ZESTRIL) 20 mg tablet Instructed patient per Anesthesia Guidelines  Pre-op Showering Instructions for Surgery Patients    Before your operation, you play an important role in decreasing your risk for infection by washing with special antiseptic soap  This is an effective way to reduce bacteria on the skin which may help to prevent infections at the surgical site  Please read the following directions in advance  1  In the week before your operation, purchase a 4 ounce bottle of antiseptic soap containing chlorhexidine gluconate (CHG)  4%  Some brand names include: Aplicare®, Endure, and Hibiclens®  The cost is usually less than $5 00   For your convenience, the [employer] carries the soap   It may also be available at your doctors office or pre-admission testing center, and at most retail pharmacies   If you are allergic or sensitive to soaps containing CHG, please let your doctor know so another antiseptic can be suggested   CHG antiseptic soap is for external use only  2  The day before your operation, follow these instructions carefully to get ready   Please clean linens (sheets) on your bed; you should sleep on clean sheets after your evening shower   Get clean towels and washcloth ready  you need enough for 2 showers   Set aside clean underwear, pajamas, and clothing to wear after the showers     Reminders:   DO NOT use any other soap or body rinse on your skin during or after the antiseptic showers   DO NOT use lotion, powder, deodorant, or perfume/aftershave of any kind on your skin after your antiseptic shower   DO NOT shave any body parts in the 24 hours/day before your operation   DO NOT get the antiseptic soap in your eyes, ears, nose, mouth, or vaginal area    3   You will need to shower the night before AND the morning of your surgery  Shower 1:   The first evening before the operation, take the first shower   First, shampoo your hair with regular shampoo and rinse it completely before you use the antiseptic soap  After washing and rinsing your hair, rinse your body   Next, use a clean washcloth to apply the antiseptic soap and wash your body from the neck down to your toes using ½ bottle of the antiseptic soap   You will use the other ½ bottle for the second shower   Clean the area where your incision will be; lather this area well for about 2 minutes   If you are having head or neck surgery, wash areas with the antiseptic soap   Rinse yourself completely with running water   Use a clean towel to dry off   Wear clean underwear and clothing/pajamas  Shower 2   The morning of your operation, take the second shower following the same steps as Shower 1 using the second ½ of the bottle of antiseptic soap   Use clean cloths and towels to wash and dry yourself   Wear clean underwear and clothing

## 2020-07-20 ENCOUNTER — TELEPHONE (OUTPATIENT)
Dept: OBGYN CLINIC | Facility: HOSPITAL | Age: 35
End: 2020-07-20

## 2020-07-20 ENCOUNTER — ANESTHESIA EVENT (OUTPATIENT)
Dept: PERIOP | Facility: HOSPITAL | Age: 35
End: 2020-07-20
Payer: OTHER MISCELLANEOUS

## 2020-07-20 ENCOUNTER — TELEPHONE (OUTPATIENT)
Dept: OBGYN CLINIC | Facility: MEDICAL CENTER | Age: 35
End: 2020-07-20

## 2020-07-20 ENCOUNTER — TELEPHONE (OUTPATIENT)
Dept: PAIN MEDICINE | Facility: MEDICAL CENTER | Age: 35
End: 2020-07-20

## 2020-07-20 NOTE — ANESTHESIA PREPROCEDURE EVALUATION
Review of Systems/Medical History          Cardiovascular  Exercise tolerance (METS): >4,  Hypertension ,    Pulmonary  Negative pulmonary ROS        GI/Hepatic  Negative GI/hepatic ROS          Negative  ROS        Endo/Other  Negative endo/other ROS      GYN  Negative gynecology ROS          Hematology  Negative hematology ROS      Musculoskeletal  Negative musculoskeletal ROS        Neurology  Negative neurology ROS      Psychology   Negative psychology ROS Anxiety,              Physical Exam    Airway    Mallampati score: I  TM Distance: >3 FB  Neck ROM: full     Dental       Cardiovascular  Cardiovascular exam normal    Pulmonary  Pulmonary exam normal     Other Findings        Anesthesia Plan  ASA Score- 2     Anesthesia Type- general with ASA Monitors  Additional Monitors:   Airway Plan: LMA  Comment: Pt has permanent false eye lashes so we will be careful  tapping her eyes   Plan Factors-Patient not instructed to abstain from smoking on day of procedure  Patient did not smoke on day of surgery  Induction-     Postoperative Plan- Plan for postoperative opioid use  Informed Consent- Anesthetic plan and risks discussed with patient  I personally reviewed this patient with the CRNA  Discussed and agreed on the Anesthesia Plan with the CRNA  Brianna Mota           No results found for: HGBA1C    Lab Results   Component Value Date    K 4 6 06/22/2020     (H) 06/22/2020    CO2 25 06/22/2020    BUN 14 06/22/2020    CREATININE 0 95 06/22/2020    GLUF 91 06/22/2020    CALCIUM 9 0 06/22/2020    EGFR 79 06/22/2020       Lab Results   Component Value Date    WBC 6 76 06/22/2020    HGB 13 1 06/22/2020    HCT 41 3 06/22/2020    MCV 99 (H) 06/22/2020     06/22/2020

## 2020-07-20 NOTE — TELEPHONE ENCOUNTER
[facility]    Dr Kojo Nuno from the preadmission testing is calling to get a consent for patients surgery tomorrow 7/21      # [phone]

## 2020-07-20 NOTE — TELEPHONE ENCOUNTER
Castro Oneill from pre-admissions is calling for consent regarding surgery for tomorrow    C/b# [phone]

## 2020-07-21 ENCOUNTER — ANESTHESIA (OUTPATIENT)
Dept: PERIOP | Facility: HOSPITAL | Age: 35
End: 2020-07-21
Payer: OTHER MISCELLANEOUS

## 2020-07-21 ENCOUNTER — HOSPITAL ENCOUNTER (OUTPATIENT)
Facility: HOSPITAL | Age: 35
Setting detail: OUTPATIENT SURGERY
Discharge: HOME | End: 2020-07-21
Attending: ORTHOPAEDIC SURGERY | Admitting: ORTHOPAEDIC SURGERY
Payer: OTHER MISCELLANEOUS

## 2020-07-21 VITALS
OXYGEN SATURATION: 100 % | WEIGHT: 135 LBS | DIASTOLIC BLOOD PRESSURE: 69 MMHG | SYSTOLIC BLOOD PRESSURE: 117 MMHG | HEART RATE: 55 BPM | BODY MASS INDEX: 23.17 KG/M2 | TEMPERATURE: 97 F | RESPIRATION RATE: 14 BRPM

## 2020-07-21 DIAGNOSIS — S83.242A ACUTE MEDIAL MENISCUS TEAR OF LEFT KNEE, INITIAL ENCOUNTER: Primary | ICD-10-CM

## 2020-07-21 LAB
EXT PREGNANCY TEST URINE: NEGATIVE
EXT. CONTROL: NORMAL

## 2020-07-21 PROCEDURE — 29881 ARTHRS KNE SRG MNISECTMY M/L: CPT | Performed by: ORTHOPAEDIC SURGERY

## 2020-07-21 PROCEDURE — 81025 URINE PREGNANCY TEST: CPT | Performed by: ORTHOPAEDIC SURGERY

## 2020-07-21 PROCEDURE — 29881 ARTHRS KNE SRG MNISECTMY M/L: CPT | Performed by: PHYSICIAN ASSISTANT

## 2020-07-21 RX ORDER — SODIUM CHLORIDE, SODIUM LACTATE, POTASSIUM CHLORIDE, CALCIUM CHLORIDE 600; 310; 30; 20 MG/100ML; MG/100ML; MG/100ML; MG/100ML
50 INJECTION, SOLUTION INTRAVENOUS CONTINUOUS
Status: DISCONTINUED | OUTPATIENT
Start: 2020-07-21 | End: 2020-07-21 | Stop reason: HOSPADM

## 2020-07-21 RX ORDER — DEXAMETHASONE SODIUM PHOSPHATE 10 MG/ML
INJECTION, SOLUTION INTRAMUSCULAR; INTRAVENOUS AS NEEDED
Status: DISCONTINUED | OUTPATIENT
Start: 2020-07-21 | End: 2020-07-21 | Stop reason: SURG

## 2020-07-21 RX ORDER — MIDAZOLAM HYDROCHLORIDE 2 MG/2ML
INJECTION, SOLUTION INTRAMUSCULAR; INTRAVENOUS AS NEEDED
Status: DISCONTINUED | OUTPATIENT
Start: 2020-07-21 | End: 2020-07-21 | Stop reason: SURG

## 2020-07-21 RX ORDER — CEFAZOLIN SODIUM 2 G/50ML
2000 SOLUTION INTRAVENOUS ONCE
Status: COMPLETED | OUTPATIENT
Start: 2020-07-21 | End: 2020-07-21

## 2020-07-21 RX ORDER — ACETAMINOPHEN 325 MG/1
650 TABLET ORAL EVERY 6 HOURS PRN
Status: DISCONTINUED | OUTPATIENT
Start: 2020-07-21 | End: 2020-07-21 | Stop reason: HOSPADM

## 2020-07-21 RX ORDER — TRAMADOL HYDROCHLORIDE 50 MG/1
50 TABLET ORAL EVERY 6 HOURS SCHEDULED
Status: DISCONTINUED | OUTPATIENT
Start: 2020-07-21 | End: 2020-07-21 | Stop reason: HOSPADM

## 2020-07-21 RX ORDER — FENTANYL CITRATE 50 UG/ML
INJECTION, SOLUTION INTRAMUSCULAR; INTRAVENOUS AS NEEDED
Status: DISCONTINUED | OUTPATIENT
Start: 2020-07-21 | End: 2020-07-21 | Stop reason: SURG

## 2020-07-21 RX ORDER — MAGNESIUM HYDROXIDE 1200 MG/15ML
LIQUID ORAL AS NEEDED
Status: DISCONTINUED | OUTPATIENT
Start: 2020-07-21 | End: 2020-07-21 | Stop reason: HOSPADM

## 2020-07-21 RX ORDER — TRAMADOL HYDROCHLORIDE 50 MG/1
50 TABLET ORAL EVERY 6 HOURS SCHEDULED
Status: DISCONTINUED | OUTPATIENT
Start: 2020-07-21 | End: 2020-07-21

## 2020-07-21 RX ORDER — ONDANSETRON 2 MG/ML
INJECTION INTRAMUSCULAR; INTRAVENOUS AS NEEDED
Status: DISCONTINUED | OUTPATIENT
Start: 2020-07-21 | End: 2020-07-21 | Stop reason: SURG

## 2020-07-21 RX ORDER — KETOROLAC TROMETHAMINE 30 MG/ML
INJECTION, SOLUTION INTRAMUSCULAR; INTRAVENOUS AS NEEDED
Status: DISCONTINUED | OUTPATIENT
Start: 2020-07-21 | End: 2020-07-21 | Stop reason: SURG

## 2020-07-21 RX ORDER — LIDOCAINE HYDROCHLORIDE 10 MG/ML
INJECTION, SOLUTION EPIDURAL; INFILTRATION; INTRACAUDAL; PERINEURAL AS NEEDED
Status: DISCONTINUED | OUTPATIENT
Start: 2020-07-21 | End: 2020-07-21 | Stop reason: SURG

## 2020-07-21 RX ORDER — OXYCODONE HYDROCHLORIDE 5 MG/1
5 TABLET ORAL EVERY 4 HOURS PRN
Qty: 10 TABLET | Refills: 0 | Status: SHIPPED | OUTPATIENT
Start: 2020-07-21

## 2020-07-21 RX ORDER — PROPOFOL 10 MG/ML
INJECTION, EMULSION INTRAVENOUS AS NEEDED
Status: DISCONTINUED | OUTPATIENT
Start: 2020-07-21 | End: 2020-07-21 | Stop reason: SURG

## 2020-07-21 RX ADMIN — FENTANYL CITRATE 50 MCG: 50 INJECTION INTRAMUSCULAR; INTRAVENOUS at 07:30

## 2020-07-21 RX ADMIN — MIDAZOLAM HYDROCHLORIDE 2 MG: 1 INJECTION, SOLUTION INTRAMUSCULAR; INTRAVENOUS at 07:29

## 2020-07-21 RX ADMIN — TRAMADOL HYDROCHLORIDE 50 MG: 50 TABLET, FILM COATED ORAL at 09:35

## 2020-07-21 RX ADMIN — LIDOCAINE HYDROCHLORIDE 50 MG: 10 INJECTION, SOLUTION EPIDURAL; INFILTRATION; INTRACAUDAL; PERINEURAL at 07:32

## 2020-07-21 RX ADMIN — KETOROLAC TROMETHAMINE 30 MG: 30 INJECTION, SOLUTION INTRAMUSCULAR; INTRAVENOUS at 08:08

## 2020-07-21 RX ADMIN — ONDANSETRON HYDROCHLORIDE 4 MG: 2 INJECTION, SOLUTION INTRAMUSCULAR; INTRAVENOUS at 08:08

## 2020-07-21 RX ADMIN — CEFAZOLIN SODIUM 2000 MG: 2 SOLUTION INTRAVENOUS at 07:28

## 2020-07-21 RX ADMIN — DEXAMETHASONE SODIUM PHOSPHATE 4 MG: 10 INJECTION, SOLUTION INTRAMUSCULAR; INTRAVENOUS at 07:35

## 2020-07-21 RX ADMIN — SODIUM CHLORIDE, SODIUM LACTATE, POTASSIUM CHLORIDE, AND CALCIUM CHLORIDE: .6; .31; .03; .02 INJECTION, SOLUTION INTRAVENOUS at 07:25

## 2020-07-21 RX ADMIN — PROPOFOL 200 MG: 10 INJECTION, EMULSION INTRAVENOUS at 07:32

## 2020-07-21 RX ADMIN — FENTANYL CITRATE 50 MCG: 50 INJECTION INTRAMUSCULAR; INTRAVENOUS at 07:49

## 2020-07-21 NOTE — DISCHARGE INSTRUCTIONS
INSTRUCTIONS FOLLOWING YOUR KNEE ARTHROSCOPY    FIRST FOLLOW-UP VISIT AFTER SURGERY      Call the office the day after your surgery & make an appointment for 1 week to see Dr Monica Taveras  At that appointment, your stitches will be removed  CANE OR CRUTCHES      You may put as much weight on your leg as tolerated when using the crutches/cane  When you feel that the crutches/cane is not necessary, you may discontinue its use  Use common sense, let pain be your guide for your activities  Climbing stairs, walking and sitting are all permitted as you tolerate them  EXERCISE PROGRAM      At your 1st follow-up visit you will be given a prescription for physical therapy if you have not already been given one  SHOWERING      Keep original bandage on for 4 days after surgery  After 4 days, it is okay to get your incision wet & you may shower  Leave the steri strips in place over the incisions  Do not take any baths or soak in a hot tub or pool until cleared by the physician  INCISION SITE      You may notice a small amount of blood on your bandage, about the size of a quarter, this is normal and there is no need to worry  If you notice uncontrollable or excessive bleeding, oozing, or redness of the wound please call the office  SWELLING AND DISCOMFORT   You will experience some pain and discomfort after surgery  Please take Advil 400 mg orally every 6 hours, as well as Tylenol 650 mg orally every 6 hours  You can alternate these medications, taking either Advil or Tylenol every 3 hours for 4 days  This will give you a baseline level of pain control and lessen the need for narcotics  You have been given a narcotic pain medicine, which can be used in addition to the Advil and Tylenol on an as needed basis  You will notice some swelling of your knee after surgery, this is normal      To help reduce the swelling, elevate your leg as much as possible the first two days       Ice the knee at least 4-5 times a day if possible  Do not keep ice on for more than 15-20 minutes  BLOOD CLOT PREVENTION    Ambulate throughout the day, and perform ankle pumps every hour while awake  Take one aspirin 325 mg orally every day for 3 weeks after surgery to prevent a blood clot  If at any time you have discomfort, swelling, or redness in the calf (behind the leg between the knee and the ankle)  or difficulty breathing or heaviness in the chest, please call the doctor immediately  TEMPERATURE      A temperature of less than 101 degrees is common for the first 1-2 days after surgery  If your temperature is elevated above 101 degrees, call the office  REMEMBER - these are only guidelines for what to expect   If you have any questions or concerns, please do not hesitate to call the office  [phone]

## 2020-07-21 NOTE — OP NOTE
OPERATIVE REPORT  PATIENT NAME: Easton Hogan    :  1985  MRN: [id number]  Pt Location:  OR ROOM 12    SURGERY DATE: 2020    Surgeon(s) and Role:     * Barry Stubbs,  - Primary     * Enoc Powell - Assisting     * Rolf Corley PA-C - Assisting    Preop Diagnosis:  Other tear of medial meniscus, current injury, left knee, initial encounter [S83 242A]    Post-Op Diagnosis Codes:     * Other tear of medial meniscus, current injury, left knee, initial encounter [S83 242A]    Procedure(s) (LRB):  ARTHROSCOPIC MENISCECTOMY PARTIAL LATERAL (Left)    Specimen(s):  * No specimens in log *    Estimated Blood Loss:   Minimal    Drains:  * No LDAs found *    Anesthesia Type:   General/LMA    Operative Indications: Other tear of medial meniscus, current injury, left knee, initial encounter [P93 270R]    Complications:   None    Procedure and Technique:    Pre-operative Diagnosis: Left knee lateral meniscus complex tear     Post-operative Diagnosis: Left knee lateral meniscus complex tear     Operation:  Surgical arthroscopy of the Left knee with partial lateral meniscectomy     Anesthesia:  general     Tourniquet Time:  15 min     Blood Loss:  Minimal      Indications: Ms Yissel Henriquez is a 37 y o  female with a lateral meniscus tear causing pain non responsive to conservative measures  An MRI was obtained a revealed a tear of the Left lateral meniscus  Due to the patient's MRI findings, active lifestyle, and lack of improvement with a conservative approach, it was recommended that they proceed forward with arthroscopic surgical management of this problem  We reviewed risks and benefits of surgery and a decision was made to proceed with surgery to address the torn lateral meniscus  Findings:       Examination under anesthesia of the operative Left knee revealed a range of motion of 0-130 degrees  Posterior drawer testing was negative  Lachman testing was negative   Pivot shift testing was negative,  Collateral ligament stability testing revealed no laxity with valgus or varus stresses  With respect to posterolateral corner testing, dial testing at 30 and at 90 degrees was symmetric to the contralateral knee  Arthroscopic evaluation of the knee revealed the following:     Medial meniscus: No Tears   Medial femoral condyle:Grade 0 chondral defects  Medial tibial plateau: Grade  0 chondral defects  Anterior cruciate ligament: Normal appearance  Posterior cruciate ligament: Normal appearance  Lateral meniscus: There was a complex, multidirectional tear of the lateral meniscus     Lateral femoral condyle: Grade 0 chondral defects  Lateral tibial plateau: Grade0 chondral defects  Medial and lateral gutters: No loose bodies  Patella: Grade 0 chondral defects  Trochlea: Grade 0 chondral defects  Medial plica: No significant plica was present             Procedure: In the pre-operative holding area, the patient identified the correct operative extremity and I marked that extremity with my initials, using a permanent marker  The patient was brought to the operating room and positioned supine  Following satisfactory induction of anesthesia, the Left knee was prepped and draped in the usual sterile fashion for surgical arthroscopy of the Left knee  Before any surgical instrumentation was passed to me by the surgical technician, a formalized time-out occurred, which involves the surgeon, circulating nurse, and anesthesia staff all verifying the correct operative extremity  My initials were visible on the prepped and draped operative field  The anatomic landmarks of the anteromedial and anterolateral portals were marked and these portal sites were injected with 1% lidocaine with epinephrine  Subsequently, 40 mL of 1% lidocaine with epinephrine was injected into the knee through a superolateral puncture  The anterolateral portal was established with a scalpel   The arthroscope was introduced through this portal  Under direct visualization, the anteromedial portal was established with a localizing needle followed by a scalpel  A probe was then introduced into the anteromedial portal  A systematic diagnostic arthroscopy evaluated the following:  medial compartment, notch, lateral compartment, patellofemoral compartment, medial gutter, and lateral gutter  A complex lateral meniscus tear was noted  This tear was associated with meniscal fragments that were grossly unstable to probing  The lateral meniscus tear was debrided to a stable base, using the arthroscopic biters and motorized shaver  There was no additional pathology  All particulate debris was removed  The knee was copiously rinsed and then drained  The portals were closed with an interrupted 4-0 monocryl absorbable suture  The skin was cleansed with sterile saline and dried before Steri-Strips were applied  Finally, a sterile dressing was secured by Webril and an Ace wrap        I was present for the entire procedure, A qualified resident physician was not available and A physician assistant was required during the procedure for retraction tissue handling,dissection and suturing    Patient Disposition:  PACU     SIGNATURE: Shira Giles DO  DATE: July 21, 2020  TIME: 8:09 AM

## 2020-07-21 NOTE — ANESTHESIA POSTPROCEDURE EVALUATION
Post-Op Assessment Note    CV Status:  Stable  Pain Score: 0    Pain management: adequate     Mental Status:  Alert   Hydration Status:  Stable   PONV Controlled:  Controlled   Airway Patency:  Patent   Post Op Vitals Reviewed: Yes      Staff: CRNA           BP      Temp     Pulse 102   Resp 20   SpO2 100

## 2020-07-29 ENCOUNTER — TELEPHONE (OUTPATIENT)
Dept: OBGYN CLINIC | Facility: HOSPITAL | Age: 35
End: 2020-07-29

## 2020-07-29 NOTE — TELEPHONE ENCOUNTER
[facility] called to confirm our fax number for OVN request   Provided with [phone]    PO appt date and time confirmed

## 2020-07-30 ENCOUNTER — OFFICE VISIT (OUTPATIENT)
Dept: OBGYN CLINIC | Facility: MEDICAL CENTER | Age: 35
End: 2020-07-30

## 2020-07-30 VITALS
BODY MASS INDEX: 23.05 KG/M2 | HEART RATE: 89 BPM | SYSTOLIC BLOOD PRESSURE: 122 MMHG | WEIGHT: 135 LBS | HEIGHT: 64 IN | DIASTOLIC BLOOD PRESSURE: 77 MMHG | TEMPERATURE: 98.3 F

## 2020-07-30 DIAGNOSIS — Z98.890 S/P ARTHROSCOPIC SURGERY OF LEFT KNEE: Primary | ICD-10-CM

## 2020-07-30 PROCEDURE — 99024 POSTOP FOLLOW-UP VISIT: CPT | Performed by: ORTHOPAEDIC SURGERY

## 2020-07-30 NOTE — PROGRESS NOTES
Knee Post Operative Visit     Assesment:     37 y o  female 1 week s/p surgical arthroscopy of the left knee with lateral meniscectomy, DOS: 7/21/2020    Plan:    Post-Operative treatment:    Ice to knee for 20 minutes at least 1-2 times daily  PT for ROM/strengthening to knee, hip and core  OTC NSAIDS prn for pain  Work note written    Imaging: All imaging from today was reviewed by myself and explained to the patient  Weight bearing:  as tolerated     ROM:  Full    Brace:  No brace needed    DVT Prophylaxis:  ASA 325mg for another 2 weeks and  Ambulation    Follow up:   6 weeks     Patient was advised that if they have any fevers, chills, chest pain, shortness of breath, redness or drainage from the incision, please let our office know immediately  Chief Complaint   Patient presents with    Left Knee - Post-op       History of Present Illness: The patient is a 37 y o  female who is being evaluated post operatively 1 week  status post surgical arthroscopy of the left knee with lateral meniscectomy, DOS:  07/21/2020  Arvind Terry Since the prior visit, She reports significant improvement  Patient states that sharp stabbing pain that she had prior to surgery has resolved  She still has a slight ache about the knee but nothing compared to preop  Patient does note some stiffness but has not attended physical therapy yet  Pain is well controlled  The patient is using ice to control swelling  They have not started physical therapy  The patient taking ASA 325mg BID for DVT ppx  The patient has been ambulating without crutches  The patient has been ambulating without a brace  The patient denies any fevers, chills, calf pain, chest pain/shortness of breath, redness or drainage from the incision  I have reviewed the past medical, surgical, social and family history, medications and allergies as documented in the EMR      Review of systems: ROS is negative other than that noted in the HPI   Constitutional: Negative for fatigue and fever  Physical Exam:    Blood pressure 122/77, pulse 89, temperature 98 3 °F (36 8 °C), height 5' 4" (1 626 m), weight 61 2 kg (135 lb)  General/Constitutional: NAD, well developed, well nourished  HENT: Normocephalic, atraumatic  CV: Intact distal pulses, regular rate  Resp: No respiratory distress or labored breathing  Lymphatic: No lymphadenopathy palpated  Neuro: Alert and Oriented x 3, no focal deficits  Psych: Normal mood, normal affect, normal judgement, normal behavior  Skin: Warm, dry, no rashes, no erythema      Knee Exam (focused):                   RIGHT LEFT   ROM:   0-130 0-130   Palpation: Effusion negative minimal     MJL tenderness Negative Negative     LJL tenderness Negative Negative   Instability: Varus stable stable     Valgus stable stable   Special Tests: Lachman Negative Negative     Posterior drawer Negative Negative     Anterior drawer Negative Negative     Pivot shift not tested not tested     Dial not tested not tested   Patella: Palpation no tenderness no tenderness     Mobility 1/4 1/4     Apprehension Negative Negative   Other: Single leg 1/4 squat not tested not tested      Incisions show no erythema, no drainage    LE NV Exam: +2 DP/PT pulses bilaterally  Sensation intact to light touch L2-S1 bilaterally     Bilateral hip ROM demonstrates no pain actively or passively    No calf tenderness to palpation bilaterally

## 2020-07-30 NOTE — LETTER
July 30, 2020     Patient: Ramandeep Contreras   YOB: 1985   Date of Visit: 7/30/2020       To Whom it May Concern:    Ramandeep Contreras is under my professional care  She was seen in my office on 7/30/2020  She is unable to return to work at this time  We are expecting 4-6 weeks for full recovery and return to full duty at work  Patient will be seen back in the office in 6 weeks in which her work status will be re-evaluated  If you have any questions or concerns, please don't hesitate to call           Sincerely,          Calli Henderson DO        CC: No Recipients

## 2020-08-04 ENCOUNTER — EVALUATION (OUTPATIENT)
Dept: PHYSICAL THERAPY | Facility: MEDICAL CENTER | Age: 35
End: 2020-08-04
Payer: OTHER MISCELLANEOUS

## 2020-08-04 DIAGNOSIS — Z47.89 ORTHOPEDIC AFTERCARE: Primary | ICD-10-CM

## 2020-08-04 DIAGNOSIS — S83.289A: ICD-10-CM

## 2020-08-04 PROCEDURE — 97110 THERAPEUTIC EXERCISES: CPT | Performed by: PHYSICAL THERAPIST

## 2020-08-04 PROCEDURE — 97161 PT EVAL LOW COMPLEX 20 MIN: CPT | Performed by: PHYSICAL THERAPIST

## 2020-08-04 NOTE — PROGRESS NOTES
PT Evaluation     Today's date: 2020  Patient name: Oren Birch  : 1985  MRN: [id number]  Referring provider: Negro Delgadillo PA-C  Dx:   Encounter Diagnosis     ICD-10-CM    1  Orthopedic aftercare  Z47 89    2  Other tear of lateral meniscus of knee as current injury, unspecified laterality, initial encounter  S83 289A Ambulatory referral to Physical Therapy       Start Time: 1100  Stop Time: 1145  Total time in clinic (min): 45 minutes    Assessment  Assessment details: Oren Birch is a 38 y/o female who presents with complaints of L knee pain  The patients greatest concerns are not being able to return to mountain biking and the gym  Primary movement impairment diagnosis of neuromuscular control deficit of quadriceps consistent with left lateral menisectomy, which limits her ability to perform everyday activities without pain  Pt  will benefit from skilled PT services that includes manual therapy techniques to enhance tissue extensibility, neuromuscular re-education to facilitate motor control, therapeutic exercise to increase functional mobility, and modalities prn to reduce pain and inflammation          Impairments: abnormal gait, abnormal muscle firing, abnormal or restricted ROM, abnormal movement, impaired physical strength, lacks appropriate home exercise program and pain with function  Understanding of Dx/Px/POC: good   Prognosis: good    Goals  Impairment Goals  - Pt I with initial HEP in 1-2 visits  - Improve ROM equal to contralateral side in 4-6 weeks  - Increase strength to 5/5 in all affected areas in 4-6 weeks    Functional Goals  - Increase Functional Status Measure to: 72+ in 4-6 weeks  - Patient will be independent with comprehensive HEP in 4-6 weeks  - Ambulation is improved to prior level of function in 4-6 weeks  - Stair climbing is improved to prior level of function in 4-6 weeks  - Squatting is improved to prior level of function in 4-6 weeks      Plan  Patient would benefit from: PT eval  Planned therapy interventions: joint mobilization, manual therapy, massage, balance, coordination, flexibility, functional ROM exercises, gait training, graded exercise, therapeutic exercise, therapeutic activities, stretching, strengthening, patient education and neuromuscular re-education  Frequency: 1-2x week  Duration in weeks: 6  Treatment plan discussed with: patient        Subjective Evaluation    History of Present Illness  Date of surgery: 2020  Mechanism of injury: Zechariah Ortiz is a 37 y o  female who presents today 2 weeks s/p left knee lateral menisectomy performed 2020  Patient reports that the original injury is due to a fall at work  Patient reports that she has seen significant improvement following surgery  She reports having completed previous physical therapy  She states that she had surgery following imaging and  failed conservative management  She states there are no complications following surgery and no signs and symptoms of DVT  Pain  Current pain ratin  At best pain ratin  At worst pain ratin  Quality: throbbing  Relieving factors: ice  Aggravating factors: stair climbing and walking    Social Support  Steps to enter house: yes  Stairs in house: yes   Lives in: multiple-level home  Lives with: spouse      Diagnostic Tests  MRI studies: abnormal  Treatments  Previous treatment: physical therapy  Patient Goals  Patient goals for therapy: return to sport/leisure activities, independence with ADLs/IADLs, increased motion, increased strength and return to work  Patient goal: return to mountain biking        Objective     Observations   Left Knee   Positive for incision  Negative for drainage, edema, effusion and spasms  Additional Observation Details  Incisions are well healed  No signs of infection  Incision mobility WFL  Decrease flexion during swing of L LE      Functional squat: R weight shift, apprehension, limited    Tenderness Left Knee   No tenderness in the fibular head, inferior patella, lateral joint line, lateral patella, medial joint line, medial patella and superior patella  Additional Tenderness Details  Tenderness over incision sight  Neurological Testing     Sensation     Knee   Left Knee   Intact: light touch    Right Knee   Intact: light touch     Additional Neurological Details  Reflexes NT  Active Range of Motion   Left Hip   Normal active range of motion    Right Hip   Normal active range of motion  Left Knee   Flexion: 115 degrees   Extension: 5 degrees     Right Knee   Flexion: 135 degrees   Extension: -4 degrees     Passive Range of Motion   Left Hip   Normal passive range of motion    Right Hip   Normal passive range of motion    Mobility   Patellar Mobility:   Left Knee   Hypermobile: left medial and left lateral    Hypomobile: left superior and left inferior    Right Knee   WFL: superior and inferior  Hypermobile: medial and lateral     Patellar Static Positioning   Left Knee: WFL  Right Knee: [facility]    Strength/Myotome Testing     Left Hip   Planes of Motion   Flexion: 4+  Extension: 4-  Abduction: 4  Adduction: 4+  External rotation: 4  Internal rotation: 4-    Isolated Muscles   Gluteus navjot: 3+  Gluteus medius: 4-  Iliopsoas: 4    Right Hip   Planes of Motion   Flexion: 4+  Extension: 4-  Abduction: 4  Adduction: 4+  External rotation: 4  Internal rotation: 4    Isolated Muscles   Gluteus maximums: 4  Gluteus medius: 4-  Iliopsoas: 4    Left Knee   Flexion: 4-  Prone flexion: 4-  Extension: 4-  Quadriceps contraction: fair    Right Knee   Flexion: 4-  Prone flexion: 4-  Extension: 4  Quadriceps contraction: good    Tests     Additional Tests Details  No pertinent tests       Ambulation     Observational Gait   Gait: antalgic and circumduction     Functional Assessment        Single Leg Stance   Left: 10 seconds  Right: 10 seconds    Precautions: HTN  DOS: 7/21/2020    Manuals 8/4            Patellar mobs             PROM                                       Neuro Re-Ed                                                                                                        Ther Ex             Heel slides 10x            SLR 10x            Quad sets 10s            Gastroc str 3x30s            Hamstring str EOT 3x30s                                                   Ther Activity                                       Gait Training                                       Modalities                                          Flowsheet Rows      Most Recent Value   PT/OT G-Codes   Current Score  54   Projected Score  72

## 2020-08-06 ENCOUNTER — OFFICE VISIT (OUTPATIENT)
Dept: PHYSICAL THERAPY | Facility: MEDICAL CENTER | Age: 35
End: 2020-08-06
Payer: OTHER MISCELLANEOUS

## 2020-08-06 DIAGNOSIS — S83.289A: Primary | ICD-10-CM

## 2020-08-06 DIAGNOSIS — Z47.89 ORTHOPEDIC AFTERCARE: ICD-10-CM

## 2020-08-06 PROCEDURE — 97140 MANUAL THERAPY 1/> REGIONS: CPT | Performed by: PHYSICAL THERAPIST

## 2020-08-06 PROCEDURE — 97110 THERAPEUTIC EXERCISES: CPT | Performed by: PHYSICAL THERAPIST

## 2020-08-06 NOTE — PROGRESS NOTES
Daily Note     Today's date: 2020  Patient name: Rodrigo Coffey  : 1985  MRN: [id number]  Referring provider: Verna Singh PA-C  Dx:   Encounter Diagnosis     ICD-10-CM    1  Other tear of lateral meniscus of knee as current injury, unspecified laterality, initial encounter  S83 289A    2  Orthopedic aftercare  Z47 89                   Subjective: Patient states she has been compliant with her HEP but did not progress past 15 reps  Objective: See treatment diary below      Assessment: Patient with improved knee flexion and improved quad set since initial evaluation  Progressed interventions with focus on quad control  Patient tolerated treatment session well  Patient fatigued post treatment session  Patient deferred CP at end of session and will ice at home  Monitor response nv and progress nv as appropriate  Plan: Continue per plan of care  Progress treatment as tolerated           Precautions: HTN  DOS: 2020    Manuals  8/6           Patellar mobs             PROM                                       Neuro Re-Ed             Quad sets  5"x30           Quad set + SLR  2x10                                                                            Ther Ex             Heel slides 10x 3x10           Gastroc str 3x30s 30"x3           Hamstring str EOT 3x30s 30"x3           Bike- AAROM  x5'           SLR: abduction  2x10           SLR: extension  2x10           Prone hamstring curl  2x10           Heel raises  nv           Ther Activity             Step ups  nv           Mini squats  nv                                                  Modalities             CP prn  deferred

## 2020-08-07 ENCOUNTER — APPOINTMENT (OUTPATIENT)
Dept: PHYSICAL THERAPY | Facility: MEDICAL CENTER | Age: 35
End: 2020-08-07
Payer: OTHER MISCELLANEOUS

## 2020-08-11 ENCOUNTER — OFFICE VISIT (OUTPATIENT)
Dept: PHYSICAL THERAPY | Facility: MEDICAL CENTER | Age: 35
End: 2020-08-11
Payer: OTHER MISCELLANEOUS

## 2020-08-11 DIAGNOSIS — S83.289A: Primary | ICD-10-CM

## 2020-08-11 DIAGNOSIS — Z47.89 ORTHOPEDIC AFTERCARE: ICD-10-CM

## 2020-08-11 PROCEDURE — 97110 THERAPEUTIC EXERCISES: CPT | Performed by: PHYSICAL THERAPIST

## 2020-08-11 PROCEDURE — 97112 NEUROMUSCULAR REEDUCATION: CPT | Performed by: PHYSICAL THERAPIST

## 2020-08-11 NOTE — PROGRESS NOTES
Daily Note     Today's date: 2020  Patient name: Brent Ivy  : 1985  MRN: [id number]  Referring provider: King Gilliam PA-C  Dx:   Encounter Diagnosis     ICD-10-CM    1  Other tear of lateral meniscus of knee as current injury, unspecified laterality, initial encounter  S83 289A    2  Orthopedic aftercare  Z47 89                   Subjective: Patient states she was on her feet for several hours yesterday so had more discomfort and swelling in the knee at the end of the day  She states pain is primarily located anterior just distal to the patella  Objective: See treatment diary below      Assessment: Patient continues to make progress towards her goals  Quad set good  Initiated standing strengthening with curing for maintaining valgus control at the knee with step ups and squatting  Patient fatigued post treatment session  Patient will benefit from continued PT  Plan: Continue per plan of care  Progress treatment as tolerated           Precautions: HTN  DOS: 2020    Manuals  8 8          Patellar mobs   np          PROM   np                                    Neuro Re-Ed             Quad sets  5"x30 Prone 5"x30          Quad set + SLR  2x10 2x10          SLS   nv          Lateral step downs                                                                 Ther Ex             Heel slides 10x 3x10 HEP          Gastroc str 3x30s 30"x3 30"x3          Hamstring str EOT 3x30s 30"x3 30"x3          Bike- AAROM  x5' x10'          SLR: abduction  2x10 2x10          SLR: extension  2x10 2x10          Prone hamstring curl  2x10 2x10          Heel raises  nv 2x10          bridge   5" x20          minisquats  nv x15          Step ups  nv L3 x15          Leg press                                                    Modalities             CP prn  deferred At home

## 2020-08-12 ENCOUNTER — APPOINTMENT (OUTPATIENT)
Dept: PHYSICAL THERAPY | Facility: MEDICAL CENTER | Age: 35
End: 2020-08-12
Payer: OTHER MISCELLANEOUS

## 2020-08-13 ENCOUNTER — OFFICE VISIT (OUTPATIENT)
Dept: PHYSICAL THERAPY | Facility: MEDICAL CENTER | Age: 35
End: 2020-08-13
Payer: OTHER MISCELLANEOUS

## 2020-08-13 DIAGNOSIS — Z47.89 ORTHOPEDIC AFTERCARE: ICD-10-CM

## 2020-08-13 DIAGNOSIS — S83.289A: Primary | ICD-10-CM

## 2020-08-13 PROCEDURE — 97112 NEUROMUSCULAR REEDUCATION: CPT | Performed by: PHYSICAL THERAPIST

## 2020-08-13 PROCEDURE — 97110 THERAPEUTIC EXERCISES: CPT | Performed by: PHYSICAL THERAPIST

## 2020-08-13 NOTE — PROGRESS NOTES
Daily Note     Today's date: 2020  Patient name: Daniella German  : 1985  MRN: [id number]  Referring provider: Isabell Peters PA-C  Dx:   Encounter Diagnosis     ICD-10-CM    1  Other tear of lateral meniscus of knee as current injury, unspecified laterality, initial encounter  S83 289A    2  Orthopedic aftercare  Z47 [id number]                   Subjective: Patient reports she iced when she got home after last visit  She states she was tired and rested most of the rest of the day  Patient denies any increase in pain or swelling or soreness following previous treatment session  Objective: See treatment diary below      Assessment: Patient tolerated treatment session well with increased reps of exercises and addition of SLS  Patient demonstrated good quad control during all therapeutic exercises and good SL balance with self perturbations of drawing the ABC's  Patient fatigued post treatment session  Patient elected to ice at home  Patient advised to use caution with walking on the sand over the weekend as it is an uneven, variable surface, to risk twisting at the knee  Patient will benefit from continued PT services to improve quad strength and control  Progress nv as appropriate as indicated in treatment diary below  Plan: Continue per plan of care  Progress treatment as tolerated           Precautions: HTN  DOS: 2020    Manuals  8         Patellar mobs   np          PROM   np                                    Neuro Re-Ed             Quad sets  5"x30 Prone 5"x30 Prone 5"x30         Quad set + SLR  2x10 2x10 3x10         SLS   nv ABC's x1         Lateral step downs    nv                                                             Ther Ex             Heel slides 10x 3x10 HEP          Gastroc str 3x30s 30"x3 30"x3 30"x3         Hamstring str EOT 3x30s 30"x3 30"x3 30"x3         Bike- AAROM  x5' x10' x10'         SLR: abduction  2x10 2x10 3x10         SLR: extension  2x10 2x10 3x10 Prone hamstring curl  2x10 2x10 3x10         Heel raises  nv 2x10 3x10         bridge   5" x20 5"x25         minisquats  nv x15 x20         Step ups  nv L3 x15 L3 x20         Leg press    nv                                                Modalities             CP prn  deferred At home

## 2020-08-14 ENCOUNTER — APPOINTMENT (OUTPATIENT)
Dept: PHYSICAL THERAPY | Facility: MEDICAL CENTER | Age: 35
End: 2020-08-14
Payer: OTHER MISCELLANEOUS

## 2020-08-18 ENCOUNTER — OFFICE VISIT (OUTPATIENT)
Dept: PHYSICAL THERAPY | Facility: MEDICAL CENTER | Age: 35
End: 2020-08-18
Payer: OTHER MISCELLANEOUS

## 2020-08-18 DIAGNOSIS — Z47.89 ORTHOPEDIC AFTERCARE: ICD-10-CM

## 2020-08-18 DIAGNOSIS — S83.289A: Primary | ICD-10-CM

## 2020-08-18 PROCEDURE — 97110 THERAPEUTIC EXERCISES: CPT | Performed by: PHYSICAL THERAPIST

## 2020-08-18 PROCEDURE — 97112 NEUROMUSCULAR REEDUCATION: CPT | Performed by: PHYSICAL THERAPIST

## 2020-08-18 NOTE — PROGRESS NOTES
Daily Note     Today's date: 2020  Patient name: Patricia Castro  : 1985  MRN: [id number]  Referring provider: Alex Small PA-C  Dx:   Encounter Diagnosis     ICD-10-CM    1  Other tear of lateral meniscus of knee as current injury, unspecified laterality, initial encounter  S83 289A    2  Orthopedic aftercare  Z47 89                   Subjective: Patient arrived 10 minutes late to treatment session but was accommodated  Patient reports improvements in stair negotiation  She states she was at the beach over the weekend and had difficulty walking on the sand and did have increased swelling at the end of each day but does report mild soreness today  Objective: See treatment diary below      Assessment: Held on exercise progressions secondary to soreness over the weekend and soreness/fatigue after performance of established therapeutic interventions  Patient demonstrates good quad control but does fatigue as noted with form fatigue  Patient denied pain at end of session  Monitor response nv and progress interventions as appropriate  Patient will benefit from continued PT  Plan: Continue per plan of care  Progress treatment as tolerated           Precautions: HTN  DOS: 2020    Manuals         Patellar mobs   np          PROM   np                                    Neuro Re-Ed             Quad sets  5"x30 Prone 5"x30 Prone 5"x30 Prone 5"x30        Quad set + SLR  2x10 2x10 3x10 3x10        SLS   nv ABC's x1 ABC's x1        Lateral step downs    nv nv                                                            Ther Ex             Heel slides 10x 3x10 HEP          Gastroc str 3x30s 30"x3 30"x3 30"x3 30"x3        Hamstring str EOT 3x30s 30"x3 30"x3 30"x3 30"x3        Bike- AAROM  x5' x10' x10' x5'        SLR: abduction  2x10 2x10 3x10 3x10        SLR: extension  2x10 2x10 3x10 3x10        Prone hamstring curl  2x10 2x10 3x10 3x10        Heel raises  nv 2x10 3x10 3x10 bridge   5" x20 5"x25 5"x30        minisquats  nv x15 x20 x20        Step ups  nv L3 x15 L3 x20 L3 x20        Leg press    nv nv                                               Modalities             CP prn  deferred At home

## 2020-08-21 ENCOUNTER — OFFICE VISIT (OUTPATIENT)
Dept: PHYSICAL THERAPY | Facility: MEDICAL CENTER | Age: 35
End: 2020-08-21
Payer: OTHER MISCELLANEOUS

## 2020-08-21 DIAGNOSIS — S83.289A: Primary | ICD-10-CM

## 2020-08-21 DIAGNOSIS — Z47.89 ORTHOPEDIC AFTERCARE: ICD-10-CM

## 2020-08-21 PROCEDURE — 97110 THERAPEUTIC EXERCISES: CPT | Performed by: PHYSICAL THERAPIST

## 2020-08-21 PROCEDURE — 97112 NEUROMUSCULAR REEDUCATION: CPT | Performed by: PHYSICAL THERAPIST

## 2020-08-21 NOTE — PROGRESS NOTES
Daily Note     Today's date: 2020  Patient name: Gaetano Madrigal  : 1985  MRN: [id number]  Referring provider: Salina Sood PA-C  Dx:   Encounter Diagnosis     ICD-10-CM    1  Other tear of lateral meniscus of knee as current injury, unspecified laterality, initial encounter  S83 289A    2  Orthopedic aftercare  Z47 89                   Subjective: Patient reports she feels much better compared to previous treatment session  She states she jogged light down her driveway  Objective: See treatment diary below    Outcome measures: FOTO  = 65 (improved from 54 at intake)    Assessment: Patient tolerated treatment session well  Able to progress therapeutic exercises with good tolerance  Added lateral step downs with cueing for form to prevent anterior knee pain  Challenged single leg balance with step up progression  Patient fatigued post treatment session  Plan: Continue per plan of care  Progress treatment as tolerated           Precautions: HTN  DOS: 2020    Manuals        Patellar mobs   np          PROM   np                                    Neuro Re-Ed             Quad sets  5"x30 Prone 5"x30 Prone 5"x30 Prone 5"x30 Prone 5"x30       Quad set + SLR  2x10 2x10 3x10 3x10 1# 2x10       SLS   nv ABC's x1 ABC's x1 ABC's x1       Lateral step downs    nv nv L1 2x10       Around the world                                                    Ther Ex             Heel slides 10x 3x10 HEP          Gastroc str 3x30s 30"x3 30"x3 30"x3 30"x3 30"x3       Hamstring str EOT 3x30s 30"x3 30"x3 30"x3 30"x3 30"x3       Bike- AAROM  x5' x10' x10' x5' x10'       SLR: abduction  2x10 2x10 3x10 3x10 1# 2x10       SLR: extension  2x10 2x10 3x10 3x10 1# 2x10       Prone hamstring curl  2x10 2x10 3x10 3x10 pink 2x10       Heel raises  nv 2x10 3x10 3x10 SL 2x10       bridge   5" x20 5"x25 5"x30 5"x30       minisquats  nv x15 x20 x20 np       Step ups + SL blance  nv L3 x15 L3 x20 L3 x20 L3 x20       Leg press    nv nv DL 50# 2x10                                                Modalities             CP prn  deferred At home

## 2020-08-25 ENCOUNTER — OFFICE VISIT (OUTPATIENT)
Dept: PHYSICAL THERAPY | Facility: MEDICAL CENTER | Age: 35
End: 2020-08-25
Payer: OTHER MISCELLANEOUS

## 2020-08-25 DIAGNOSIS — S83.289A: Primary | ICD-10-CM

## 2020-08-25 DIAGNOSIS — Z47.89 ORTHOPEDIC AFTERCARE: ICD-10-CM

## 2020-08-25 PROCEDURE — 97110 THERAPEUTIC EXERCISES: CPT | Performed by: PHYSICAL THERAPIST

## 2020-08-25 PROCEDURE — 97112 NEUROMUSCULAR REEDUCATION: CPT | Performed by: PHYSICAL THERAPIST

## 2020-08-25 NOTE — PROGRESS NOTES
Daily Note     Today's date: 2020  Patient name: Pam Barboza  : 1985  MRN: [id number]  Referring provider: Joceline Garcia PA-C  Dx:   Encounter Diagnosis     ICD-10-CM    1  Other tear of lateral meniscus of knee as current injury, unspecified laterality, initial encounter  S83 289A    2  Orthopedic aftercare  Z47 89                   Subjective: Patient states she wore wedges to an outdoor wedding over the weekend  She states the surfaces she was walking on were uneven  She states she had no difficulty walking but was tired and sore after, which would be expected  Objective: See treatment diary below    Assessment: Patient continues to make progress towards her goals each week  Cueing for quad lag with SLR with increased reps encouraging patient to rest when fatigued vs trying to power through all reps at once to maximize benefit of that specific exericse  She did experience anterior knee pain with lateral step down which resolved with medial patellar glide  Progressed to SL leg press with focus on eccentric lowering  Patient fatigued post treatment session  Patient will benefit from continued PT to enable return to all activities at [facility]  Plan: Continue per plan of care  Progress treatment as tolerated           Precautions: HTN  DOS: 2020    Manuals  8      Patellar mobs   np          PROM   np                                    Neuro Re-Ed             Quad sets  5"x30 Prone 5"x30 Prone 5"x30 Prone 5"x30 Prone 5"x30 Prone 3# 5"x30      Quad set + SLR  2x10 2x10 3x10 3x10 1# 2x10 1# 3x10      SLS   nv ABC's x1 ABC's x1 ABC's x1 ABC's x1      Lateral step downs    nv nv L1 2x10 L1 2x10 w/ manual patellar medial glide      Around the [place]       nv                                             Ther Ex             Heel slides 10x 3x10 HEP          Gastroc str 3x30s 30"x3 30"x3 30"x3 30"x3 30"x3 30"x3      Hamstring str EOT 3x30s 30"x3 30"x3 30"x3 30"x3 30"x3 30"x3      Bike- AAROM  x5' x10' x10' x5' x10' x10' L2      SLR: abduction  2x10 2x10 3x10 3x10 1# 2x10 1# 3x10      SLR: extension  2x10 2x10 3x10 3x10 1# 2x10 1# 3x10      Prone hamstring curl  2x10 2x10 3x10 3x10 pink 2x10 Pink 2x10      Heel raises  nv 2x10 3x10 3x10 SL 2x10 SL 2x10      bridge   5" x20 5"x25 5"x30 5"x30 SL 5" x10 ea      minisquats  nv x15 x20 x20 np       Step ups + SL blance  nv L3 x15 L3 x20 L3 x20 L3 x20 L3 x30      Leg press    nv nv DL 50# 2x10   SL 30# 2x10                                             Modalities             CP prn  deferred At home

## 2020-08-27 ENCOUNTER — OFFICE VISIT (OUTPATIENT)
Dept: PHYSICAL THERAPY | Facility: MEDICAL CENTER | Age: 35
End: 2020-08-27
Payer: OTHER MISCELLANEOUS

## 2020-08-27 DIAGNOSIS — Z47.89 ORTHOPEDIC AFTERCARE: ICD-10-CM

## 2020-08-27 DIAGNOSIS — S83.289A: Primary | ICD-10-CM

## 2020-08-27 PROCEDURE — 97112 NEUROMUSCULAR REEDUCATION: CPT | Performed by: PHYSICAL THERAPIST

## 2020-08-27 PROCEDURE — 97110 THERAPEUTIC EXERCISES: CPT | Performed by: PHYSICAL THERAPIST

## 2020-08-27 NOTE — PROGRESS NOTES
Daily Note     Today's date: 2020  Patient name: Linda Garcia  : 1985  MRN: [id number]  Referring provider: Andres Neely PA-C  Dx:   Encounter Diagnosis     ICD-10-CM    1  Other tear of lateral meniscus of knee as current injury, unspecified laterality, initial encounter  S83 289A    2  Orthopedic aftercare  Z47 [id number]                   Subjective: Patient reports mild soreness following previous treatment session  Objective: See treatment diary below    Assessment: Patient with decreased fatigue during SLR and able to maintain quad set throughout duration of exercise  She does continue to require cueing for speed to maximize control and technique  Patient fatigued post treatment session  She will benefit form continued PT for appropriate selection and progression of therapeutic exercises as she progresses towards her goals each week  Plan: Continue per plan of care  Progress treatment as tolerated           Precautions: HTN  DOS: 2020    Manuals      Patellar mobs   np          PROM   np                                    Neuro Re-Ed             Quad sets  5"x30 Prone 5"x30 Prone 5"x30 Prone 5"x30 Prone 5"x30 Prone 3# 5"x30 Prone 3# 5" x30     Quad set + SLR  2x10 2x10 3x10 3x10 1# 2x10 1# 3x10 1# 3x10     SLS   nv ABC's x1 ABC's x1 ABC's x1 ABC's x1 ABC'sx 2     Lateral step downs    nv nv L1 2x10 L1 2x10 w/ manual patellar medial glide L2 2x10 w/ manual patellar medial glide     Around the [place]       nv                                             Ther Ex             Heel slides 10x 3x10 HEP          Gastroc str 3x30s 30"x3 30"x3 30"x3 30"x3 30"x3 30"x3 30"x3     Hamstring str EOT 3x30s 30"x3 30"x3 30"x3 30"x3 30"x3 30"x3 30"x3     Bike- AAROM  x5' x10' x10' x5' x10' x10' L2 x10' L2     SLR: abduction  2x10 2x10 3x10 3x10 1# 2x10 1# 3x10 1# 3x10     SLR: extension  2x10 2x10 3x10 3x10 1# 2x10 1# 3x10 1# 3x10     Prone hamstring curl  2x10 2x10 3x10 3x10 pink 2x10 Pink 2x10 Pink 3x10     Heel raises  nv 2x10 3x10 3x10 SL 2x10 SL 2x10 SL 2x10     bridge   5" x20 5"x25 5"x30 5"x30 SL 5" x10 ea SL 3x10 ea     minisquats  nv x15 x20 x20 np       Step ups + SL blance  nv L3 x15 L3 x20 L3 x20 L3 x20 L3 x30 L3 x30     Leg press    nv nv DL 50# 2x10   SL 30# 2x10 SL 30# 3x10                                            Modalities             CP prn  deferred At home

## 2020-08-28 ENCOUNTER — APPOINTMENT (OUTPATIENT)
Dept: PHYSICAL THERAPY | Facility: MEDICAL CENTER | Age: 35
End: 2020-08-28
Payer: OTHER MISCELLANEOUS

## 2020-09-01 ENCOUNTER — OFFICE VISIT (OUTPATIENT)
Dept: PHYSICAL THERAPY | Facility: MEDICAL CENTER | Age: 35
End: 2020-09-01
Payer: OTHER MISCELLANEOUS

## 2020-09-01 DIAGNOSIS — Z47.89 ORTHOPEDIC AFTERCARE: ICD-10-CM

## 2020-09-01 DIAGNOSIS — S83.289A: Primary | ICD-10-CM

## 2020-09-01 PROCEDURE — 97110 THERAPEUTIC EXERCISES: CPT | Performed by: PHYSICAL THERAPIST

## 2020-09-01 PROCEDURE — 97112 NEUROMUSCULAR REEDUCATION: CPT | Performed by: PHYSICAL THERAPIST

## 2020-09-01 NOTE — PROGRESS NOTES
Daily Note     Today's date: 2020  Patient name: Judy Alba  : 1985  MRN: [id number]  Referring provider: Maximiliano Boone PA-C  Dx:   Encounter Diagnosis     ICD-10-CM    1  Other tear of lateral meniscus of knee as current injury, unspecified laterality, initial encounter  S83 289A    2  Orthopedic aftercare  Z47 89                   Subjective: Patient reports that she kneeled on her couch and is now having some pain in the anterior knee  Objective: See treatment diary below    Assessment: Pt was able to increase to #2lbs with SLR with no increases in pain and was able to perform with good eccentric control t/o each set  She did continue to have difficulty with eccentric control during step downs and fatigued with the 2nd set  She would continue to benefit from progression in eccentric control and hip stability progression  Plan: Continue per plan of care  Progress treatment as tolerated           Precautions: HTN  DOS: 2020    Manuals     Patellar mobs   np          PROM   np                                    Neuro Re-Ed             Quad sets  5"x30 Prone 5"x30 Prone 5"x30 Prone 5"x30 Prone 5"x30 Prone 3# 5"x30 Prone 3# 5" x30 Prone 3# 5" x30    Quad set + SLR  2x10 2x10 3x10 3x10 1# 2x10 1# 3x10 1# 3x10 2# 3x10    SLS   nv ABC's x1 ABC's x1 ABC's x1 ABC's x1 ABC'sx 2 ABC'sx 2    Lateral step downs    nv nv L1 2x10 L1 2x10 w/ manual patellar medial glide L2 2x10 w/ manual patellar medial glide L2 2x10 w/ manual patellar medial glide    Around the world       nv                                             Ther Ex             Heel slides 10x 3x10 HEP          Gastroc str 3x30s 30"x3 30"x3 30"x3 30"x3 30"x3 30"x3 30"x3 30"x3    Hamstring str EOT 3x30s 30"x3 30"x3 30"x3 30"x3 30"x3 30"x3 30"x3 30"x3    Bike- AAROM  x5' x10' x10' x5' x10' x10' L2 x10' L2 x10' L2    SLR: abduction  2x10 2x10 3x10 3x10 1# 2x10 1# 3x10 1# 3x10 2# 3x10    SLR: extension  2x10 2x10 3x10 3x10 1# 2x10 1# 3x10 1# 3x10 2# 3x10    Prone hamstring curl  2x10 2x10 3x10 3x10 pink 2x10 Pink 2x10 Pink 3x10 Pink 3x10    Heel raises  nv 2x10 3x10 3x10 SL 2x10 SL 2x10 SL 2x10 SL 2x10    bridge   5" x20 5"x25 5"x30 5"x30 SL 5" x10 ea SL 3x10 ea SL 3x10 ea    minisquats  nv x15 x20 x20 np       Step ups + SL blance  nv L3 x15 L3 x20 L3 x20 L3 x20 L3 x30 L3 x30 L3 x30    Leg press    nv nv DL 50# 2x10   SL 30# 2x10 SL 30# 3x10 SL 30# 3x10                                           Modalities             CP prn  deferred At home

## 2020-09-04 ENCOUNTER — OFFICE VISIT (OUTPATIENT)
Dept: PHYSICAL THERAPY | Facility: MEDICAL CENTER | Age: 35
End: 2020-09-04
Payer: OTHER MISCELLANEOUS

## 2020-09-04 DIAGNOSIS — S83.289A: Primary | ICD-10-CM

## 2020-09-04 DIAGNOSIS — Z47.89 ORTHOPEDIC AFTERCARE: ICD-10-CM

## 2020-09-04 PROCEDURE — 97112 NEUROMUSCULAR REEDUCATION: CPT

## 2020-09-04 PROCEDURE — 97110 THERAPEUTIC EXERCISES: CPT

## 2020-09-04 NOTE — PROGRESS NOTES
Daily Note     Today's date: 2020  Patient name: Demi Chris  : 1985  MRN: [id number]  Referring provider: Renetta Jo PA-C  Dx:   Encounter Diagnosis     ICD-10-CM    1  Other tear of lateral meniscus of knee as current injury, unspecified laterality, initial encounter  S83 289A    2  Orthopedic aftercare  Z47 89            1:1 with PTA CR 9:00- 9:40  Unbilled 9:40-9:50       Subjective: Continues with anterior knee pain primarily at portal site  States that she has been performing self scar tissue massage at home  Able to go for 5 mile bike ride after last session without issue ( flat terrain )  Objective: See treatment diary below    Assessment:  Cues to avoid genu valgus with squats and lateral step downs  Consider addition of sidestepping or clamshells for additional glute med strengthening  She would continue to benefit from progression in eccentric control and hip stability progression  Plan: Continue per plan of care  Progress treatment as tolerated           Precautions: HTN  DOS: 2020    Manuals    Patellar mobs   np          PROM   np                                    Neuro Re-Ed             Quad sets  5"x30 Prone 5"x30 Prone 5"x30 Prone 5"x30 Prone 5"x30 Prone 3# 5"x30 Prone 3# 5" x30 Prone 3# 5" x30 Prone   3#  5"x30   Quad set + SLR  2x10 2x10 3x10 3x10 1# 2x10 1# 3x10 1# 3x10 2# 3x10 2#  3x10   SLS   nv ABC's x1 ABC's x1 ABC's x1 ABC's x1 ABC'sx 2 ABC'sx 2 ABC's  x2   Lateral step downs    nv nv L1 2x10 L1 2x10 w/ manual patellar medial glide L2 2x10 w/ manual patellar medial glide L2 2x10 w/ manual patellar medial glide L2  2x10  W/ manual patellar medial glide   Around the world       nv                                             Ther Ex             Heel slides 10x 3x10 HEP          Gastroc str 3x30s 30"x3 30"x3 30"x3 30"x3 30"x3 30"x3 30"x3 30"x3 30"x3   Hamstring str EOT 3x30s 30"x3 30"x3 30"x3 30"x3 30"x3 30"x3 30"x3 30"x3 30"x3   Bike- AAROM  x5' x10' x10' x5' x10' x10' L2 x10' L2 x10' L2 10'  L2   SLR: abduction  2x10 2x10 3x10 3x10 1# 2x10 1# 3x10 1# 3x10 2# 3x10 2#  3x10   SLR: extension  2x10 2x10 3x10 3x10 1# 2x10 1# 3x10 1# 3x10 2# 3x10 2#  3x10   Prone hamstring curl  2x10 2x10 3x10 3x10 pink 2x10 Pink 2x10 Pink 3x10 Pink 3x10 Pink  3x10   Heel raises  nv 2x10 3x10 3x10 SL 2x10 SL 2x10 SL 2x10 SL 2x10 SL 3x10   bridge   5" x20 5"x25 5"x30 5"x30 SL 5" x10 ea SL 3x10 ea SL 3x10 ea SL  3x10 ea     minisquats  nv x15 x20 x20 np       Step ups + SL blance  nv L3 x15 L3 x20 L3 x20 L3 x20 L3 x30 L3 x30 L3 x30 L3  3x10   Leg press    nv nv DL 50# 2x10   SL 30# 2x10 SL 30# 3x10 SL 30# 3x10 SL  30#  3x10                                          Modalities             CP prn  deferred At home       declined

## 2020-09-08 ENCOUNTER — APPOINTMENT (OUTPATIENT)
Dept: PHYSICAL THERAPY | Facility: MEDICAL CENTER | Age: 35
End: 2020-09-08
Payer: OTHER MISCELLANEOUS

## 2020-09-09 ENCOUNTER — OFFICE VISIT (OUTPATIENT)
Dept: PHYSICAL THERAPY | Facility: MEDICAL CENTER | Age: 35
End: 2020-09-09
Payer: OTHER MISCELLANEOUS

## 2020-09-09 DIAGNOSIS — S83.289A: Primary | ICD-10-CM

## 2020-09-09 DIAGNOSIS — Z47.89 ORTHOPEDIC AFTERCARE: ICD-10-CM

## 2020-09-09 PROCEDURE — 97110 THERAPEUTIC EXERCISES: CPT | Performed by: PHYSICAL THERAPIST

## 2020-09-09 PROCEDURE — 97112 NEUROMUSCULAR REEDUCATION: CPT | Performed by: PHYSICAL THERAPIST

## 2020-09-09 NOTE — PROGRESS NOTES
Daily Note     Today's date: 2020  Patient name: Bairon Pina  : 1985  MRN: [id number]  Referring provider: Wilner Troncoso PA-C  Dx:   Encounter Diagnosis     ICD-10-CM    1  Other tear of lateral meniscus of knee as current injury, unspecified laterality, initial encounter  S83 289A    2  Orthopedic aftercare  Z47 [id number]               Subjective: Patient reports soreness after camping over the weekend  Overall, she reports 85-90% improvement but does still feel weak with lateral step downs  She was able to jog  Objective: See treatment diary below    Assessment:  Patient making appropriate progress towards her goals  She demonstrates improved quad control and improved dynamic valgus control  She does continue to require cueing for valgus control with SL leg press and lateral step downs  We did discuss transitioning either to 1x/week or HEP  I did explain I would like her glute med strength to further improve before she progresses her running program further to prevent future injury  We did progress glute med strengthening today in which she did fatigue  Patient did report muscular soreness post treatment session but denied knee pain  Follow up with MD   Plan: Transition to HEP by end of month          Precautions: HTN  DOS: 2020    Manuals    Patellar mobs  np           PROM  np                                     Neuro Re-Ed             Quad sets 5"x30 Prone 5"x30 Prone 5"x30 Prone 5"x30 Prone 5"x30 Prone 3# 5"x30 Prone 3# 5" x30 Prone 3# 5" x30 Prone   3#  5"x30 Prone 5# 5"x30   Quad set + SLR 2x10 2x10 3x10 3x10 1# 2x10 1# 3x10 1# 3x10 2# 3x10 2#  3x10 3# 3x10   SLS  nv ABC's x1 ABC's x1 ABC's x1 ABC's x1 ABC'sx 2 ABC'sx 2 ABC's  x2 ABC's x1 foam   Lateral step downs   nv nv L1 2x10 L1 2x10 w/ manual patellar medial glide L2 2x10 w/ manual patellar medial glide L2 2x10 w/ manual patellar medial glide L2  2x10  W/ manual patellar medial glide L 2x10 w/ manual patellar medial glide   Around the [place]      nv                                              Ther Ex             Heel slides 3x10 HEP           Gastroc str 30"x3 30"x3 30"x3 30"x3 30"x3 30"x3 30"x3 30"x3 30"x3 30"x3   Hamstring str EOT 30"x3 30"x3 30"x3 30"x3 30"x3 30"x3 30"x3 30"x3 30"x3 30"x3   Bike- AAROM x5' x10' x10' x5' x10' x10' L2 x10' L2 x10' L2 10'  L2 10' L3   SLR: abduction 2x10 2x10 3x10 3x10 1# 2x10 1# 3x10 1# 3x10 2# 3x10 2#  3x10 3# 3x10   SLR: extension 2x10 2x10 3x10 3x10 1# 2x10 1# 3x10 1# 3x10 2# 3x10 2#  3x10 3# 3x10   Prone hamstring curl 2x10 2x10 3x10 3x10 pink 2x10 Pink 2x10 Pink 3x10 Pink 3x10 Pink  3x10 Green 2x10   Heel raises nv 2x10 3x10 3x10 SL 2x10 SL 2x10 SL 2x10 SL 2x10 SL 3x10 SL 3x10   bridge  5" x20 5"x25 5"x30 5"x30 SL 5" x10 ea SL 3x10 ea SL 3x10 ea SL  3x10 ea   SL 3x10 ea   Step ups + SL blance nv L3 x15 L3 x20 L3 x20 L3 x20 L3 x30 L3 x30 L3 x30 L3  3x10 L3 3x10   Leg press   nv nv DL 50# 2x10   SL 30# 2x10 SL 30# 3x10 SL 30# 3x10 SL  30#  3x10 SL 40# 3x10   Clam shells (B)          2x10   Lateral band walks          OTB 10 steps x3 laps                Modalities             CP prn deferred At home       declined

## 2020-09-10 ENCOUNTER — OFFICE VISIT (OUTPATIENT)
Dept: OBGYN CLINIC | Facility: MEDICAL CENTER | Age: 35
End: 2020-09-10

## 2020-09-10 ENCOUNTER — OFFICE VISIT (OUTPATIENT)
Dept: PHYSICAL THERAPY | Facility: MEDICAL CENTER | Age: 35
End: 2020-09-10
Payer: OTHER MISCELLANEOUS

## 2020-09-10 VITALS
WEIGHT: 155 LBS | HEIGHT: 64 IN | SYSTOLIC BLOOD PRESSURE: 116 MMHG | DIASTOLIC BLOOD PRESSURE: 78 MMHG | TEMPERATURE: 97.7 F | BODY MASS INDEX: 26.46 KG/M2 | HEART RATE: 74 BPM

## 2020-09-10 DIAGNOSIS — S83.289A: Primary | ICD-10-CM

## 2020-09-10 DIAGNOSIS — Z47.89 ORTHOPEDIC AFTERCARE: ICD-10-CM

## 2020-09-10 DIAGNOSIS — Z98.890 S/P ARTHROSCOPIC SURGERY OF LEFT KNEE: Primary | ICD-10-CM

## 2020-09-10 PROCEDURE — 97112 NEUROMUSCULAR REEDUCATION: CPT | Performed by: PHYSICAL THERAPIST

## 2020-09-10 PROCEDURE — 97110 THERAPEUTIC EXERCISES: CPT | Performed by: PHYSICAL THERAPIST

## 2020-09-10 PROCEDURE — 99024 POSTOP FOLLOW-UP VISIT: CPT | Performed by: ORTHOPAEDIC SURGERY

## 2020-09-10 NOTE — PROGRESS NOTES
Daily Note     Today's date: 9/10/2020  Patient name: Sebastian Ni  : 1985  MRN: [id number]  Referring provider: Milka Man PA-C  Dx:   Encounter Diagnosis     ICD-10-CM    1  Other tear of lateral meniscus of knee as current injury, unspecified laterality, initial encounter  S83 289A    2  Orthopedic aftercare  [id number]               Subjective: Patient reports she had her follow up with her surgeon  She states he would like her to continue PT to further improve strength  Objective: See treatment diary below    Assessment:  Continued to focus on proximal strength and stability and quad control for maintaining valgus control at the knee during dynamic activities  Patient required intermittent cueing for speed with exercises and technique  Patient fatigued post treatment session  Held on some exercises due to muscular fatigue- resume nv  Plan: Continue PT POC          Precautions: HTN  DOS: 2020    Manuals 8/11 8/13 8/18 8/21 8/25 8/27 9/1 9/4 9/9 9/10   Patellar mobs np            PROM np                                      Neuro Re-Ed             Quad sets Prone 5"x30 Prone 5"x30 Prone 5"x30 Prone 5"x30 Prone 3# 5"x30 Prone 3# 5" x30 Prone 3# 5" x30 Prone   3#  5"x30 Prone 5# 5"x30 Prone 5# 5"x30   Quad set + SLR 2x10 3x10 3x10 1# 2x10 1# 3x10 1# 3x10 2# 3x10 2#  3x10 3# 3x10 3# 3x10   SLS nv ABC's x1 ABC's x1 ABC's x1 ABC's x1 ABC'sx 2 ABC'sx 2 ABC's  x2 ABC's x1 foam ABC's x1 foam   Lateral step downs  nv nv L1 2x10 L1 2x10 w/ manual patellar medial glide L2 2x10 w/ manual patellar medial glide L2 2x10 w/ manual patellar medial glide L2  2x10  W/ manual patellar medial glide L 2x10 w/ manual patellar medial glide nv   Ski lunges     nv     3x10                                          Ther Ex             Prone quad stretch with towel roll          30"x3   Gastroc str 30"x3 30"x3 30"x3 30"x3 30"x3 30"x3 30"x3 30"x3 30"x3 30"x3   Hamstring str EOT 30"x3 30"x3 30"x3 30"x3 30"x3 30"x3 30"x3 30"x3 30"x3 30"x3   Bike- AAROM x10' x10' x5' x10' x10' L2 x10' L2 x10' L2 10'  L2 10' L3 10' L3   SLR: abduction 2x10 3x10 3x10 1# 2x10 1# 3x10 1# 3x10 2# 3x10 2#  3x10 3# 3x10 3# 3x10   SLR: extension 2x10 3x10 3x10 1# 2x10 1# 3x10 1# 3x10 2# 3x10 2#  3x10 3# 3x10 3# 3x10   Prone hamstring curl 2x10 3x10 3x10 pink 2x10 Pink 2x10 Pink 3x10 Pink 3x10 Pink  3x10 Green 2x10 Green 2x10   Heel raises 2x10 3x10 3x10 SL 2x10 SL 2x10 SL 2x10 SL 2x10 SL 3x10 SL 3x10 SL 3x10   bridge 5" x20 5"x25 5"x30 5"x30 SL 5" x10 ea SL 3x10 ea SL 3x10 ea SL  3x10 ea   SL 3x10 ea np   Step ups + SL blance L3 x15 L3 x20 L3 x20 L3 x20 L3 x30 L3 x30 L3 x30 L3  3x10 L3 3x10 Step #2 3x10   Leg press  nv nv DL 50# 2x10   SL 30# 2x10 SL 30# 3x10 SL 30# 3x10 SL  30#  3x10 SL 40# 3x10 nv   Clam shells (B)         2x10 2x10   Lateral band walks         OTB 10 steps x3 laps OTB 10 steps x3 laps   Bridge on pball with hamstring curl          3x5   Modalities             CP prn At home       declined

## 2020-09-10 NOTE — PROGRESS NOTES
Knee Post Operative Visit     Assesment:     37 y o  female 7 weeks s/p surgical arthroscopy of the left knee with partial lateral meniscectomy, DOS: 7/21/20    Plan:    Post-Operative treatment:    Ice to knee for 20 minutes at least 1-2 times daily  PT for ROM/strengthening to knee, hip and core  OTC NSAIDS prn for pain  Work note written stating that she can return to full duty without restriction    Imaging: All imaging from today was reviewed by myself and explained to the patient  Weight bearing:  as tolerated     ROM:  Full    Brace:  No brace needed    DVT Prophylaxis:  Ambulation    Follow up:   8 weeks     Patient was advised that if they have any fevers, chills, chest pain, shortness of breath, redness or drainage from the incision, please let our office know immediately  Chief Complaint   Patient presents with    Left Knee - Post-op       History of Present Illness: The patient is a 37 y o  female who is being evaluated post operatively 7 weeks status post surgical arthroscopy of the left knee with partial lateral meniscectomy, DOS: 7/21/20  Since the prior visit, She reports significant improvement  She states that she continues to go to physical therapy and has made great improvement  Patient states that she does not experience the sharp stabbing pain lateral aspect of the knee that she felt prior to surgery  Patient states she has full range of motion  Patient has having any pain or instability about the knee  Patient states that she had a very activity filled week and states that the knee held up well  She states that the physical therapy she will have a slight anterior knee pain but this resolves with icing  Pain is well controlled  The patient is using ice to control swelling  They have started physical therapy  The patient Ambulation for DVT ppx  The patient has been ambulating without crutches      The patient has been ambulating without a brace     The patient denies any fevers, chills, calf pain, chest pain/shortness of breath, redness or drainage from the incision  I have reviewed the past medical, surgical, social and family history, medications and allergies as documented in the EMR  Review of systems: ROS is negative other than that noted in the HPI  Constitutional: Negative for fatigue and fever  Physical Exam:    Blood pressure 116/78, pulse 74, temperature 97 7 °F (36 5 °C), height 5' 4" (1 626 m), weight 70 3 kg (155 lb)  General/Constitutional: NAD, well developed, well nourished  HENT: Normocephalic, atraumatic  CV: Intact distal pulses, regular rate  Resp: No respiratory distress or labored breathing  Lymphatic: No lymphadenopathy palpated  Neuro: Alert and Oriented x 3, no focal deficits  Psych: Normal mood, normal affect, normal judgement, normal behavior  Skin: Warm, dry, no rashes, no erythema      Knee Exam (focused):                   RIGHT LEFT   ROM:   0-130 0-130   Palpation: Effusion negative negative     MJL tenderness Negative Negative     LJL tenderness Negative Negative   Instability: Varus stable stable     Valgus stable stable   Special Tests: Lachman Negative Negative     Posterior drawer Negative Negative     Anterior drawer Negative Negative     Pivot shift not tested not tested     Dial not tested not tested   Patella: Palpation no tenderness no tenderness     Mobility 1/4 1/4     Apprehension Negative Negative   Other: Single leg 1/4 squat not tested not tested      Incisions show no erythema, no drainage    LE NV Exam: +2 DP/PT pulses bilaterally  Sensation intact to light touch L2-S1 bilaterally     Bilateral hip ROM demonstrates no pain actively or passively    No calf tenderness to palpation bilaterally

## 2020-09-10 NOTE — LETTER
September 10, 2020     Patient: Ramandeep Contreras   YOB: 1985   Date of Visit: 9/10/2020       To Whom it May Concern:    Ramandeep Contreras is under my professional care  She was seen in my office on 9/10/2020  She may return to work on 9/11/2020, with no restrictions  If you have any questions or concerns, please don't hesitate to call           Sincerely,          Calli Henderson,         CC: Bianca Ray

## 2020-09-11 ENCOUNTER — APPOINTMENT (OUTPATIENT)
Dept: PHYSICAL THERAPY | Facility: MEDICAL CENTER | Age: 35
End: 2020-09-11
Payer: OTHER MISCELLANEOUS

## 2020-09-16 ENCOUNTER — OFFICE VISIT (OUTPATIENT)
Dept: PHYSICAL THERAPY | Facility: MEDICAL CENTER | Age: 35
End: 2020-09-16
Payer: OTHER MISCELLANEOUS

## 2020-09-16 DIAGNOSIS — Z47.89 ORTHOPEDIC AFTERCARE: ICD-10-CM

## 2020-09-16 DIAGNOSIS — S83.289A: Primary | ICD-10-CM

## 2020-09-16 PROCEDURE — 97110 THERAPEUTIC EXERCISES: CPT | Performed by: PHYSICAL THERAPIST

## 2020-09-16 PROCEDURE — 97112 NEUROMUSCULAR REEDUCATION: CPT | Performed by: PHYSICAL THERAPIST

## 2020-09-16 NOTE — PROGRESS NOTES
Daily Note     Today's date: 2020  Patient name: Candace Sotelo  : 1985  MRN: [id number]  Referring provider: Eileen Almanza PA-C  Dx:   Encounter Diagnosis     ICD-10-CM    1  Other tear of lateral meniscus of knee as current injury, unspecified laterality, initial encounter  S83 289A    2  Orthopedic aftercare  [id number]               Subjective: Patient reports her primary complaint is anterior knee pain with squatting  Objective: See treatment diary below    Assessment:  Patient continues to require manual patellar medial glide for step downs to perform without pain  Because of this ongoing pain, we may consider trialing MC tape for patellar positioning  Patient requires cueing and use of mirror for visual feedback for dynamic valgus control  Patient will benefit from continued PT to correct the observed impairments to enable her to return to all activities at [facility]  Plan: Continue PT POC  May trial [facility] tape          Precautions: HTN  DOS: 2020    Manuals 8/13 8/18 8/21 8/25 8/27 9/1 9/4 9/9 9/10 9/16   Patellar mobs             PROM             MC tape medial glide          nv                Neuro Re-Ed             Quad sets Prone 5"x30 Prone 5"x30 Prone 5"x30 Prone 3# 5"x30 Prone 3# 5" x30 Prone 3# 5" x30 Prone   3#  5"x30 Prone 5# 5"x30 Prone 5# 5"x30 np   Quad set + SLR 3x10 3x10 1# 2x10 1# 3x10 1# 3x10 2# 3x10 2#  3x10 3# 3x10 3# 3x10 3# 3x10   SLS ABC's x1 ABC's x1 ABC's x1 ABC's x1 ABC'sx 2 ABC'sx 2 ABC's  x2 ABC's x1 foam ABC's x1 foam ABC's x2 foam   Lateral step downs nv nv L1 2x10 L1 2x10 w/ manual patellar medial glide L2 2x10 w/ manual patellar medial glide L2 2x10 w/ manual patellar medial glide L2  2x10  W/ manual patellar medial glide L 2x10 w/ manual patellar medial glide nv L2 2x10 w/ manual patellar medial glide   Ski lunges    nv     3x10 3x10                                          Ther Ex             Prone quad stretch with towel roll         30"x3 30"x3   Gastroc str 30"x3 30"x3 30"x3 30"x3 30"x3 30"x3 30"x3 30"x3 30"x3 30"x3   Hamstring str EOT 30"x3 30"x3 30"x3 30"x3 30"x3 30"x3 30"x3 30"x3 30"x3 30"x3   Bike- AAROM x10' x5' x10' x10' L2 x10' L2 x10' L2 10'  L2 10' L3 10' L3 10' L3   SLR: abduction 3x10 3x10 1# 2x10 1# 3x10 1# 3x10 2# 3x10 2#  3x10 3# 3x10 3# 3x10 3# 3x10   SLR: extension 3x10 3x10 1# 2x10 1# 3x10 1# 3x10 2# 3x10 2#  3x10 3# 3x10 3# 3x10 3# 3x10   Prone hamstring curl 3x10 3x10 pink 2x10 Pink 2x10 Pink 3x10 Pink 3x10 Pink  3x10 Green 2x10 Green 2x10 Green 3x10   Heel raises 3x10 3x10 SL 2x10 SL 2x10 SL 2x10 SL 2x10 SL 3x10 SL 3x10 SL 3x10 np   bridge 5"x25 5"x30 5"x30 SL 5" x10 ea SL 3x10 ea SL 3x10 ea SL  3x10 ea   SL 3x10 ea np np   Step ups + SL blance L3 x20 L3 x20 L3 x20 L3 x30 L3 x30 L3 x30 L3  3x10 L3 3x10 Step #2 3x10 Step #2 3x10   Leg press nv nv DL 50# 2x10   SL 30# 2x10 SL 30# 3x10 SL 30# 3x10 SL  30#  3x10 SL 40# 3x10 nv SL  40#  3x10   Clam shells (B)        2x10 2x10 3x10   Lateral band walks        OTB 10 steps x3 laps OTB 10 steps x3 laps np   Bridge on pball with hamstring curl         3x5 3x5   Modalities             CP prn       declined

## 2020-09-18 ENCOUNTER — OFFICE VISIT (OUTPATIENT)
Dept: PHYSICAL THERAPY | Facility: MEDICAL CENTER | Age: 35
End: 2020-09-18
Payer: OTHER MISCELLANEOUS

## 2020-09-18 DIAGNOSIS — Z47.89 ORTHOPEDIC AFTERCARE: ICD-10-CM

## 2020-09-18 DIAGNOSIS — S83.289A: Primary | ICD-10-CM

## 2020-09-18 PROCEDURE — 97112 NEUROMUSCULAR REEDUCATION: CPT | Performed by: PHYSICAL THERAPIST

## 2020-09-18 PROCEDURE — 97110 THERAPEUTIC EXERCISES: CPT | Performed by: PHYSICAL THERAPIST

## 2020-09-18 NOTE — PROGRESS NOTES
Daily Note     Today's date: 2020  Patient name: Mallika Morin  : 1985  MRN: [id number]  Referring provider: Kesha Dominguez PA-C  Dx:   Encounter Diagnosis     ICD-10-CM    1  Other tear of lateral meniscus of knee as current injury, unspecified laterality, initial encounter  S83 289A    2  Orthopedic aftercare  [id number]               Subjective: Patient states she is continue to progress  She states she is going to go for a bike ride later today  Objective: See treatment diary below    Assessment:  Positive response to  [address] tape with medial patellar glide  She was able to perform lateral step downs without manual glide and without pain  Tape remained in place for all exercises to assist in retraining of patellar positioning with quad control  Patient reported increased quad fatigue today with the exercises  Patient will benefit from continued PT to further improve quad control with dynamic activities  Plan: Continue PT POC  May trial  [address] tape          Precautions: HTN  DOS: 2020    Manuals  9/4 9/9 9/10 9/16 9/18   Patellar mobs             PROM             MC tape L patella medial glide         nv CK                Neuro Re-Ed             Quad set + SLR 3x10 1# 2x10 1# 3x10 1# 3x10 2# 3x10 2#  3x10 3# 3x10 3# 3x10 3# 3x10 4# 2x10   SLS ABC's x1 ABC's x1 ABC's x1 ABC'sx 2 ABC'sx 2 ABC's  x2 ABC's x1 foam ABC's x1 foam ABC's x2 foam ABC's x2 foam   Lateral step downs nv L1 2x10 L1 2x10 w/ manual patellar medial glide L2 2x10 w/ manual patellar medial glide L2 2x10 w/ manual patellar medial glide L2  2x10  W/ manual patellar medial glide L 2x10 w/ manual patellar medial glide nv L2 2x10 w/ manual patellar medial glide L2 2x10    Ski lunges   nv     3x10 3x10 3x10                                          Ther Ex             Prone quad stretch with towel roll        30"x3 30"x3 np   Gastroc str 30"x3 30"x3 30"x3 30"x3 30"x3 30"x3 30"x3 30"x3 30"x3 np   Hamstring str EOT 30"x3 30"x3 30"x3 30"x3 30"x3 30"x3 30"x3 30"x3 30"x3 np   Bike- AAROM x5' x10' x10' L2 x10' L2 x10' L2 10'  L2 10' L3 10' L3 10' L3 10' L3   SLR: abduction 3x10 1# 2x10 1# 3x10 1# 3x10 2# 3x10 2#  3x10 3# 3x10 3# 3x10 3# 3x10 4# 2x10   SLR: extension 3x10 1# 2x10 1# 3x10 1# 3x10 2# 3x10 2#  3x10 3# 3x10 3# 3x10 3# 3x10 4# 2x10   Prone hamstring curl 3x10 pink 2x10 Pink 2x10 Pink 3x10 Pink 3x10 Pink  3x10 Green 2x10 Green 2x10 Green 3x10 Green 3x10   Heel raises 3x10 SL 2x10 SL 2x10 SL 2x10 SL 2x10 SL 3x10 SL 3x10 SL 3x10 np 3x10   bridge 5"x30 5"x30 SL 5" x10 ea SL 3x10 ea SL 3x10 ea SL  3x10 ea   SL 3x10 ea np np    Step ups + SL blance L3 x20 L3 x20 L3 x30 L3 x30 L3 x30 L3  3x10 L3 3x10 Step #2 3x10 Step #2 3x10 Step #2 3x10   Leg press nv DL 50# 2x10   SL 30# 2x10 SL 30# 3x10 SL 30# 3x10 SL  30#  3x10 SL 40# 3x10 nv SL  40#  3x10 SL 43# 3x10   Clam shells (B)       2x10 2x10 3x10 3x10   Lateral band walks       OTB 10 steps x3 laps OTB 10 steps x3 laps np OTB 10 steps x3 laps   Bridge on pball with hamstring curl        3x5 3x5 3x8   Modalities             CP prn      declined

## 2020-09-23 ENCOUNTER — APPOINTMENT (OUTPATIENT)
Dept: PHYSICAL THERAPY | Facility: MEDICAL CENTER | Age: 35
End: 2020-09-23
Payer: OTHER MISCELLANEOUS

## 2020-09-25 ENCOUNTER — APPOINTMENT (OUTPATIENT)
Dept: PHYSICAL THERAPY | Facility: MEDICAL CENTER | Age: 35
End: 2020-09-25
Payer: OTHER MISCELLANEOUS

## 2020-09-28 ENCOUNTER — APPOINTMENT (OUTPATIENT)
Dept: PHYSICAL THERAPY | Facility: MEDICAL CENTER | Age: 35
End: 2020-09-28
Payer: OTHER MISCELLANEOUS

## 2020-10-06 ENCOUNTER — OFFICE VISIT (OUTPATIENT)
Dept: PHYSICAL THERAPY | Facility: MEDICAL CENTER | Age: 35
End: 2020-10-06
Payer: OTHER MISCELLANEOUS

## 2020-10-06 DIAGNOSIS — Z47.89 ORTHOPEDIC AFTERCARE: ICD-10-CM

## 2020-10-06 DIAGNOSIS — S83.289A: Primary | ICD-10-CM

## 2020-10-06 PROCEDURE — 97112 NEUROMUSCULAR REEDUCATION: CPT | Performed by: PHYSICAL THERAPIST

## 2020-10-06 PROCEDURE — 97110 THERAPEUTIC EXERCISES: CPT | Performed by: PHYSICAL THERAPIST

## 2020-10-14 ENCOUNTER — OFFICE VISIT (OUTPATIENT)
Dept: PHYSICAL THERAPY | Facility: MEDICAL CENTER | Age: 35
End: 2020-10-14
Payer: OTHER MISCELLANEOUS

## 2020-10-14 DIAGNOSIS — Z47.89 ORTHOPEDIC AFTERCARE: ICD-10-CM

## 2020-10-14 DIAGNOSIS — S83.289A: Primary | ICD-10-CM

## 2020-10-14 PROCEDURE — 97112 NEUROMUSCULAR REEDUCATION: CPT | Performed by: PHYSICAL THERAPIST

## 2020-10-14 PROCEDURE — 97110 THERAPEUTIC EXERCISES: CPT | Performed by: PHYSICAL THERAPIST

## 2020-10-22 ENCOUNTER — OFFICE VISIT (OUTPATIENT)
Dept: OBGYN CLINIC | Facility: MEDICAL CENTER | Age: 35
End: 2020-10-22
Payer: OTHER MISCELLANEOUS

## 2020-10-22 ENCOUNTER — OFFICE VISIT (OUTPATIENT)
Dept: PHYSICAL THERAPY | Facility: MEDICAL CENTER | Age: 35
End: 2020-10-22
Payer: OTHER MISCELLANEOUS

## 2020-10-22 VITALS
SYSTOLIC BLOOD PRESSURE: 110 MMHG | TEMPERATURE: 98.4 F | WEIGHT: 155 LBS | DIASTOLIC BLOOD PRESSURE: 77 MMHG | BODY MASS INDEX: 26.46 KG/M2 | HEIGHT: 64 IN | HEART RATE: 65 BPM

## 2020-10-22 DIAGNOSIS — S83.289A: Primary | ICD-10-CM

## 2020-10-22 DIAGNOSIS — Z47.89 ORTHOPEDIC AFTERCARE: ICD-10-CM

## 2020-10-22 DIAGNOSIS — Z98.890 S/P ARTHROSCOPIC SURGERY OF LEFT KNEE: ICD-10-CM

## 2020-10-22 DIAGNOSIS — M76.52 PATELLAR TENDONITIS OF LEFT KNEE: Primary | ICD-10-CM

## 2020-10-22 PROCEDURE — 97112 NEUROMUSCULAR REEDUCATION: CPT | Performed by: PHYSICAL THERAPIST

## 2020-10-22 PROCEDURE — 99214 OFFICE O/P EST MOD 30 MIN: CPT | Performed by: ORTHOPAEDIC SURGERY

## 2020-10-22 PROCEDURE — 97110 THERAPEUTIC EXERCISES: CPT | Performed by: PHYSICAL THERAPIST

## 2020-12-16 ENCOUNTER — NURSE TRIAGE (OUTPATIENT)
Dept: OTHER | Facility: OTHER | Age: 35
End: 2020-12-16

## 2020-12-16 DIAGNOSIS — Z20.822 CLOSE EXPOSURE TO COVID-19 VIRUS: ICD-10-CM

## 2020-12-16 DIAGNOSIS — U07.1 COVID-19: Primary | ICD-10-CM

## 2020-12-18 DIAGNOSIS — Z20.822 CLOSE EXPOSURE TO COVID-19 VIRUS: ICD-10-CM

## 2020-12-18 PROCEDURE — U0003 INFECTIOUS AGENT DETECTION BY NUCLEIC ACID (DNA OR RNA); SEVERE ACUTE RESPIRATORY SYNDROME CORONAVIRUS 2 (SARS-COV-2) (CORONAVIRUS DISEASE [COVID-19]), AMPLIFIED PROBE TECHNIQUE, MAKING USE OF HIGH THROUGHPUT TECHNOLOGIES AS DESCRIBED BY CMS-2020-01-R: HCPCS | Performed by: FAMILY MEDICINE

## 2020-12-19 LAB — SARS-COV-2 RNA SPEC QL NAA+PROBE: NOT DETECTED

## 2021-01-18 DIAGNOSIS — Z20.822 CLOSE EXPOSURE TO COVID-19 VIRUS: ICD-10-CM

## 2021-01-18 DIAGNOSIS — Z20.822 CLOSE EXPOSURE TO COVID-19 VIRUS: Primary | ICD-10-CM

## 2021-01-18 LAB — SARS-COV-2 RNA RESP QL NAA+PROBE: NEGATIVE

## 2021-01-18 PROCEDURE — U0003 INFECTIOUS AGENT DETECTION BY NUCLEIC ACID (DNA OR RNA); SEVERE ACUTE RESPIRATORY SYNDROME CORONAVIRUS 2 (SARS-COV-2) (CORONAVIRUS DISEASE [COVID-19]), AMPLIFIED PROBE TECHNIQUE, MAKING USE OF HIGH THROUGHPUT TECHNOLOGIES AS DESCRIBED BY CMS-2020-01-R: HCPCS | Performed by: FAMILY MEDICINE

## 2021-01-18 PROCEDURE — U0005 INFEC AGEN DETEC AMPLI PROBE: HCPCS | Performed by: FAMILY MEDICINE

## 2021-09-18 ENCOUNTER — IMMUNIZATIONS (OUTPATIENT)
Dept: FAMILY MEDICINE CLINIC | Facility: MEDICAL CENTER | Age: 36
End: 2021-09-18

## 2021-09-18 DIAGNOSIS — Z23 ENCOUNTER FOR IMMUNIZATION: Primary | ICD-10-CM

## 2021-09-18 PROCEDURE — 91300 SARSCOV2 VAC 30MCG/0.3ML IM: CPT

## 2021-10-09 ENCOUNTER — IMMUNIZATIONS (OUTPATIENT)
Dept: FAMILY MEDICINE CLINIC | Facility: MEDICAL CENTER | Age: 36
End: 2021-10-09

## 2021-10-09 DIAGNOSIS — Z23 ENCOUNTER FOR IMMUNIZATION: Primary | ICD-10-CM

## 2021-10-09 PROCEDURE — 91300 SARSCOV2 VAC 30MCG/0.3ML IM: CPT

## 2025-04-15 ENCOUNTER — APPOINTMENT (OUTPATIENT)
Dept: URGENT CARE | Age: 40
End: 2025-04-15

## (undated) DEVICE — 4-PORT MANIFOLD: Brand: NEPTUNE 2

## (undated) DEVICE — ABDOMINAL PAD: Brand: DERMACEA

## (undated) DEVICE — LIGHT GLOVE GREEN

## (undated) DEVICE — 3M™ STERI-DRAPE™ U-DRAPE 1015: Brand: STERI-DRAPE™

## (undated) DEVICE — ASTOUND STANDARD SURGICAL GOWN, XL: Brand: CONVERTORS

## (undated) DEVICE — STOCKINETTE,IMPERVIOUS,12X48,STERILE: Brand: MEDLINE

## (undated) DEVICE — NEEDLE FILTER 5 MICR 19G X 1.5IN

## (undated) DEVICE — TUBING ARTHROSCOPIC WAVE  MAIN PUMP

## (undated) DEVICE — 3M™ IOBAN™ 2 ANTIMICROBIAL INCISE DRAPE 6650EZ: Brand: IOBAN™ 2

## (undated) DEVICE — CHLORAPREP HI-LITE 26ML ORANGE

## (undated) DEVICE — SCD SEQUENTIAL COMPRESSION COMFORT SLEEVE MEDIUM KNEE LENGTH: Brand: KENDALL SCD

## (undated) DEVICE — DRAPE SHEET THREE QUARTER

## (undated) DEVICE — BLADE SHAVER DISSECTOR 4MM 13CM COOLCUT

## (undated) DEVICE — STIRRUP STRAP ADULT DISP

## (undated) DEVICE — TUBING SUCTION 5MM X 12 FT

## (undated) DEVICE — STERILE POLYISOPRENE POWDER-FREE SURGICAL GLOVES WITH EMOLLIENT COATING: Brand: PROTEXIS

## (undated) DEVICE — BETHLEHEM UNIVERSAL  ARTHRO PK: Brand: CARDINAL HEALTH

## (undated) DEVICE — SYRINGE 30ML LL

## (undated) DEVICE — STERILE POLYISOPRENE POWDER-FREE SURGICAL GLOVES: Brand: PROTEXIS

## (undated) DEVICE — SYRINGE 3ML LL

## (undated) DEVICE — 3M™ STERI-STRIP™ REINFORCED ADHESIVE SKIN CLOSURES, R1547, 1/2 IN X 4 IN (12 MM X 100 MM), 6 STRIPS/ENVELOPE: Brand: 3M™ STERI-STRIP™

## (undated) DEVICE — CUFF TOURNIQUET DISP SZ30

## (undated) DEVICE — ACE WRAP 6 IN UNSTERILE

## (undated) DEVICE — NEEDLE BLUNT 18 G X 1 1/2IN

## (undated) DEVICE — PADDING CAST 6IN COTTON STRL

## (undated) DEVICE — PUDDLEVAC FLOOR SUCTION DEVICE: Brand: PUDDLEVAC

## (undated) DEVICE — TIBURON EXTREMITY DRAPE WITH ARMBOARD COVERS: Brand: CONVERTORS

## (undated) DEVICE — GAUZE SPONGES,16 PLY: Brand: CURITY

## (undated) DEVICE — SUT MONOCRYL 4-0 PS-2 27 IN Y426H

## (undated) DEVICE — OCCLUSIVE GAUZE STRIP,3% BISMUTH TRIBROMOPHENATE IN PETROLATUM BLEND: Brand: XEROFORM

## (undated) DEVICE — INTENDED FOR TISSUE SEPARATION, AND OTHER PROCEDURES THAT REQUIRE A SHARP SURGICAL BLADE TO PUNCTURE OR CUT.: Brand: BARD-PARKER ® SAFETYLOCK CARBON RIB-BACK BLADES

## (undated) DEVICE — WEBRIL COTTON STERILE 6IN